# Patient Record
Sex: FEMALE | Race: BLACK OR AFRICAN AMERICAN | Employment: FULL TIME | URBAN - METROPOLITAN AREA
[De-identification: names, ages, dates, MRNs, and addresses within clinical notes are randomized per-mention and may not be internally consistent; named-entity substitution may affect disease eponyms.]

---

## 2017-04-01 ENCOUNTER — HOSPITAL ENCOUNTER (EMERGENCY)
Age: 37
Discharge: HOME | End: 2017-04-01
Attending: EMERGENCY MEDICINE
Payer: SELF-PAY

## 2017-04-01 VITALS
BODY MASS INDEX: 28.12 KG/M2 | SYSTOLIC BLOOD PRESSURE: 131 MMHG | TEMPERATURE: 98.1 F | RESPIRATION RATE: 18 BRPM | WEIGHT: 175 LBS | HEART RATE: 81 BPM | OXYGEN SATURATION: 100 % | DIASTOLIC BLOOD PRESSURE: 91 MMHG | HEIGHT: 66 IN

## 2017-04-01 DIAGNOSIS — F43.10 PTSD (POST-TRAUMATIC STRESS DISORDER): ICD-10-CM

## 2017-04-01 DIAGNOSIS — G43.009 MIGRAINE WITHOUT AURA AND WITHOUT STATUS MIGRAINOSUS, NOT INTRACTABLE: ICD-10-CM

## 2017-04-01 DIAGNOSIS — F41.9 ANXIETY: Primary | ICD-10-CM

## 2017-04-01 DIAGNOSIS — E87.6 HYPOKALEMIA: ICD-10-CM

## 2017-04-01 LAB
ANION GAP BLD CALC-SCNC: 10 MMOL/L (ref 5–15)
APPEARANCE UR: CLEAR
BACTERIA URNS QL MICRO: NEGATIVE /HPF
BASOPHILS # BLD AUTO: 0 K/UL (ref 0–0.1)
BASOPHILS # BLD: 0 % (ref 0–1)
BILIRUB UR QL: NEGATIVE
BUN SERPL-MCNC: 8 MG/DL (ref 6–20)
BUN/CREAT SERPL: 11 (ref 12–20)
CALCIUM SERPL-MCNC: 8.2 MG/DL (ref 8.5–10.1)
CHLORIDE SERPL-SCNC: 103 MMOL/L (ref 97–108)
CO2 SERPL-SCNC: 24 MMOL/L (ref 21–32)
COLOR UR: NORMAL
CREAT SERPL-MCNC: 0.71 MG/DL (ref 0.55–1.02)
EOSINOPHIL # BLD: 0.1 K/UL (ref 0–0.4)
EOSINOPHIL NFR BLD: 1 % (ref 0–7)
EPITH CASTS URNS QL MICRO: NORMAL /LPF
ERYTHROCYTE [DISTWIDTH] IN BLOOD BY AUTOMATED COUNT: 17.6 % (ref 11.5–14.5)
GLUCOSE SERPL-MCNC: 81 MG/DL (ref 65–100)
GLUCOSE UR STRIP.AUTO-MCNC: NEGATIVE MG/DL
HCG UR QL: NEGATIVE
HCT VFR BLD AUTO: 30.3 % (ref 35–47)
HGB BLD-MCNC: 9.6 G/DL (ref 11.5–16)
HGB UR QL STRIP: NEGATIVE
KETONES UR QL STRIP.AUTO: NEGATIVE MG/DL
LEUKOCYTE ESTERASE UR QL STRIP.AUTO: NEGATIVE
LYMPHOCYTES # BLD AUTO: 34 % (ref 12–49)
LYMPHOCYTES # BLD: 2.4 K/UL (ref 0.8–3.5)
MCH RBC QN AUTO: 23.6 PG (ref 26–34)
MCHC RBC AUTO-ENTMCNC: 31.7 G/DL (ref 30–36.5)
MCV RBC AUTO: 74.6 FL (ref 80–99)
MONOCYTES # BLD: 0.7 K/UL (ref 0–1)
MONOCYTES NFR BLD AUTO: 11 % (ref 5–13)
NEUTS SEG # BLD: 3.6 K/UL (ref 1.8–8)
NEUTS SEG NFR BLD AUTO: 54 % (ref 32–75)
NITRITE UR QL STRIP.AUTO: NEGATIVE
PH UR STRIP: 6.5 [PH] (ref 5–8)
PLATELET # BLD AUTO: 292 K/UL (ref 150–400)
POTASSIUM SERPL-SCNC: 3.2 MMOL/L (ref 3.5–5.1)
PROT UR STRIP-MCNC: NEGATIVE MG/DL
RBC # BLD AUTO: 4.06 M/UL (ref 3.8–5.2)
RBC #/AREA URNS HPF: NORMAL /HPF (ref 0–5)
SODIUM SERPL-SCNC: 137 MMOL/L (ref 136–145)
SP GR UR REFRACTOMETRY: <1.005 (ref 1–1.03)
UA: UC IF INDICATED,UAUC: NORMAL
UROBILINOGEN UR QL STRIP.AUTO: 0.2 EU/DL (ref 0.2–1)
WBC # BLD AUTO: 6.8 K/UL (ref 3.6–11)
WBC URNS QL MICRO: NORMAL /HPF (ref 0–4)

## 2017-04-01 PROCEDURE — 99285 EMERGENCY DEPT VISIT HI MDM: CPT

## 2017-04-01 PROCEDURE — 81001 URINALYSIS AUTO W/SCOPE: CPT | Performed by: EMERGENCY MEDICINE

## 2017-04-01 PROCEDURE — 36415 COLL VENOUS BLD VENIPUNCTURE: CPT | Performed by: EMERGENCY MEDICINE

## 2017-04-01 PROCEDURE — 90791 PSYCH DIAGNOSTIC EVALUATION: CPT

## 2017-04-01 PROCEDURE — 80048 BASIC METABOLIC PNL TOTAL CA: CPT | Performed by: EMERGENCY MEDICINE

## 2017-04-01 PROCEDURE — 74011250637 HC RX REV CODE- 250/637: Performed by: EMERGENCY MEDICINE

## 2017-04-01 PROCEDURE — 85025 COMPLETE CBC W/AUTO DIFF WBC: CPT | Performed by: EMERGENCY MEDICINE

## 2017-04-01 PROCEDURE — 81025 URINE PREGNANCY TEST: CPT

## 2017-04-01 PROCEDURE — 93005 ELECTROCARDIOGRAM TRACING: CPT

## 2017-04-01 RX ORDER — LORAZEPAM 1 MG/1
1 TABLET ORAL
Status: COMPLETED | OUTPATIENT
Start: 2017-04-01 | End: 2017-04-01

## 2017-04-01 RX ORDER — LORAZEPAM 0.5 MG/1
0.5 TABLET ORAL
Qty: 10 TAB | Refills: 0 | Status: SHIPPED | OUTPATIENT
Start: 2017-04-01

## 2017-04-01 RX ORDER — POTASSIUM CHLORIDE 750 MG/1
40 TABLET, FILM COATED, EXTENDED RELEASE ORAL
Status: COMPLETED | OUTPATIENT
Start: 2017-04-01 | End: 2017-04-01

## 2017-04-01 RX ADMIN — LORAZEPAM 1 MG: 1 TABLET ORAL at 08:41

## 2017-04-01 RX ADMIN — POTASSIUM CHLORIDE 40 MEQ: 750 TABLET, FILM COATED, EXTENDED RELEASE ORAL at 09:14

## 2017-04-01 NOTE — ED PROVIDER NOTES
HPI Comments: Beverley Kaur is a 39 y.o. female with PMHx significant for HTN, anemia, migraines, anxiety and palpitations presenting ambulatory to [facility] ED with c/o chest tightness and numbness/tingling in her bilateral hands since yesterday. She notes additional sx of anxiety, nausea, dizziness, chills, diarrhea since yesterday, 1 episode of vomiting this morning and acute on chronic exacerbation of her frontal headaches. The pt states that she has a Hx of migraines and notes that they have been progressively worsening. She reports that the only medications she takes for her migraines are Imitrex and states that they do not always help her with her migraines. She notes that she has also been battling depression for a long period of time due to substantial losses of her loved ones. She states that she has been seen by a psychiatrist once before but notes that it was more than 20 years ago and denies having been seen by one since then. She reports having tried Zoloft and Paxil to treat her depression but states that she did not like the way that they made her feel. She specifically denies any fevers, cough, cold sx, shortness of breath, rash, diarrhea, sweating, weight loss, chance of pregnancy or suicidal ideations. PCP: Kierra Birch MD  PSHx: tubal ligation   Social History:  (-) Tobacco,   (+) EtOH,      (-) Drugs     There are no other complaints, changes, or physical findings at this time. The history is provided by the patient. Past Medical History:   Diagnosis Date    Anemia     Hypertension     Neurological disorder     Migraines    Other ill-defined conditions     Palpitations    Psychiatric disorder     Anxiety       Past Surgical History:   Procedure Laterality Date    HX GYN      tubal ligation         History reviewed. No pertinent family history.     Social History     Social History    Marital status: SINGLE     Spouse name: N/A    Number of children: N/A    Years of education: N/A     Occupational History    Not on file. Social History Main Topics    Smoking status: Former Smoker    Smokeless tobacco: Not on file    Alcohol use Yes      Comment: Occassionally    Drug use: No    Sexual activity: Yes     Partners: Male     Other Topics Concern    Not on file     Social History Narrative         ALLERGIES: Review of patient's allergies indicates no known allergies. Review of Systems   Constitutional: Negative. Negative for activity change, appetite change, chills, fatigue, fever and unexpected weight change. HENT: Negative for congestion, hearing loss, rhinorrhea, sneezing and voice change. Eyes: Negative. Negative for pain and visual disturbance. Respiratory: Positive for chest tightness. Negative for apnea, cough, choking and shortness of breath. Cardiovascular: Negative. Negative for chest pain and palpitations. Gastrointestinal: Positive for nausea and vomiting. Negative for abdominal distention, abdominal pain, blood in stool and diarrhea. Genitourinary: Negative. Negative for difficulty urinating, flank pain, frequency and urgency. No discharge   Musculoskeletal: Negative. Negative for arthralgias, back pain, myalgias and neck stiffness. Skin: Negative. Negative for color change and rash. Neurological: Positive for dizziness, numbness and headaches. Negative for seizures, syncope, speech difficulty and weakness. Hematological: Negative for adenopathy. Psychiatric/Behavioral: Positive for dysphoric mood. Negative for agitation, behavioral problems and suicidal ideas. The patient is nervous/anxious. All other systems reviewed and are negative.       Vitals:    04/01/17 0758 04/01/17 0830 04/01/17 0927   BP: (!) 143/92 (!) 137/96 (!) 131/91   Pulse: 100 82 81   Resp: 16 18 18   Temp: 98.1 °F (36.7 °C)     SpO2: 100%  100%   Weight: 79.4 kg (175 lb)     Height: 5' 6\" (1.676 m)              Physical Exam   Nursing note and vitals reviewed. Physical Examination: General appearance - WDWN, tearful  Head - NC/AT  Eyes - pupils equal, round  and reactive, extraocular eye movements intact, conj/sclera clear, anicteric  Mouth - mucous membranes moist, pharynx normal without lesions  Nose/Ears - nares clear, Tms & canals clear  Neck - supple, no significant adenopathy, trachea midline, no crepitus, c spine diffusely non-tender, no step offs  Chest - Normal respiratory effort, clear to auscultation bilaterally, no wheezes/rales/rhonchi  Heart - normal rate and regular rhythm, S1 and S2 normal, no murmurs, gallops, or rubs  Abdomen - soft, nontender, nondistended, nabs, no masses, guarding, rebound or rigidity  Neurological - alert, oriented, normal speech, cranial nerves intact, no focal motor findings, motor & sensory diffusely intact, normal gait  Extremities/MS - peripheral pulses normal, no pedal edema, all joints atraumatic, FROM, non-tender, no gross deformities, spine diffusely non-tender  Skin - normal coloration and turgor, no rashes, no lesions or lacerations      MDM  Number of Diagnoses or Management Options  Anxiety:   Hypokalemia:   Migraine without aura and without status migrainosus, not intractable:   PTSD (post-traumatic stress disorder):   Diagnosis management comments:   DDx: anxiety arrhythmia, PTSD, depression, anemia, UTI       Amount and/or Complexity of Data Reviewed  Clinical lab tests: ordered and reviewed  Tests in the medicine section of CPT®: ordered and reviewed  Review and summarize past medical records: yes  Independent visualization of images, tracings, or specimens: yes    Patient Progress  Patient progress: stable    ED Course       Procedures    EKG interpretation: (Preliminary)  07:57  Rhythm: normal sinus rhythm; and regular .  Rate (approx.): 92; Axis: normal; NM interval: normal; QRS interval: normal ; ST/T wave: normal;  Other findings: normal.    9:29 AM  [facility] final results have been reviewed with her. She has been counseled regarding her diagnosis. She verbally conveys understanding and agreement of the signs, symptoms, diagnosis, treatment and prognosis . LABORATORY TESTS:  Recent Results (from the past 12 hour(s))   EKG, 12 LEAD, INITIAL    Collection Time: 04/01/17  7:57 AM   Result Value Ref Range    Ventricular Rate 92 BPM    Atrial Rate 92 BPM    P-R Interval 120 ms    QRS Duration 88 ms    Q-T Interval 356 ms    QTC Calculation (Bezet) 440 ms    Calculated P Axis 55 degrees    Calculated R Axis 56 degrees    Calculated T Axis 54 degrees    Diagnosis       Normal sinus rhythm  Normal ECG  When compared with ECG of 02-JAN-2012 11:37,  No significant change was found     HCG URINE, QL. - POC    Collection Time: 04/01/17  8:21 AM   Result Value Ref Range    Pregnancy test,urine (POC) NEGATIVE  NEG     CBC WITH AUTOMATED DIFF    Collection Time: 04/01/17  8:22 AM   Result Value Ref Range    WBC 6.8 3.6 - 11.0 K/uL    RBC 4.06 3.80 - 5.20 M/uL    HGB 9.6 (L) 11.5 - 16.0 g/dL    HCT 30.3 (L) 35.0 - 47.0 %    MCV 74.6 (L) 80.0 - 99.0 FL    MCH 23.6 (L) 26.0 - 34.0 PG    MCHC 31.7 30.0 - 36.5 g/dL    RDW 17.6 (H) 11.5 - 14.5 %    PLATELET 223 442 - 237 K/uL    NEUTROPHILS 54 32 - 75 %    LYMPHOCYTES 34 12 - 49 %    MONOCYTES 11 5 - 13 %    EOSINOPHILS 1 0 - 7 %    BASOPHILS 0 0 - 1 %    ABS. NEUTROPHILS 3.6 1.8 - 8.0 K/UL    ABS. LYMPHOCYTES 2.4 0.8 - 3.5 K/UL    ABS. MONOCYTES 0.7 0.0 - 1.0 K/UL    ABS. EOSINOPHILS 0.1 0.0 - 0.4 K/UL    ABS.  BASOPHILS 0.0 0.0 - 0.1 K/UL   METABOLIC PANEL, BASIC    Collection Time: 04/01/17  8:22 AM   Result Value Ref Range    Sodium 137 136 - 145 mmol/L    Potassium 3.2 (L) 3.5 - 5.1 mmol/L    Chloride 103 97 - 108 mmol/L    CO2 24 21 - 32 mmol/L    Anion gap 10 5 - 15 mmol/L    Glucose 81 65 - 100 mg/dL    BUN 8 6 - 20 MG/DL    Creatinine 0.71 0.55 - 1.02 MG/DL    BUN/Creatinine ratio 11 (L) 12 - 20      GFR est AA >60 >60 ml/min/1.73m2    GFR est non-AA >60 >60 ml/min/1.73m2    Calcium 8.2 (L) 8.5 - 10.1 MG/DL   URINALYSIS W/ REFLEX CULTURE    Collection Time: 04/01/17  8:22 AM   Result Value Ref Range    Color YELLOW/STRAW      Appearance CLEAR CLEAR      Specific gravity <1.005 1.003 - 1.030    pH (UA) 6.5 5.0 - 8.0      Protein NEGATIVE  NEG mg/dL    Glucose NEGATIVE  NEG mg/dL    Ketone NEGATIVE  NEG mg/dL    Bilirubin NEGATIVE  NEG      Blood NEGATIVE  NEG      Urobilinogen 0.2 0.2 - 1.0 EU/dL    Nitrites NEGATIVE  NEG      Leukocyte Esterase NEGATIVE  NEG      WBC 0-4 0 - 4 /hpf    RBC 0-5 0 - 5 /hpf    Epithelial cells FEW FEW /lpf    Bacteria NEGATIVE  NEG /hpf    UA:UC IF INDICATED CULTURE NOT INDICATED BY UA RESULT CNI       MEDICATIONS GIVEN:  Medications   LORazepam (ATIVAN) tablet 1 mg (1 mg Oral Given 4/1/17 0822)   potassium chloride SR (KLOR-CON 10) tablet 40 mEq (40 mEq Oral Given 4/1/17 9639)       IMPRESSION:  1. Anxiety    2. PTSD (post-traumatic stress disorder)    3. Migraine without aura and without status migrainosus, not intractable    4. Hypokalemia        PLAN:  1. Discharge Medication List as of 4/1/2017  9:08 AM        2. Follow-up Information     Follow up With Details Comments [address] [address] [phone]    [facility] [address]    Daryle Samuel, MD Schedule an appointment as soon as possible for a visit in 2 days  [address] [facility] [address] [place] [address]  [phone]          Return to ED if worse     DISCHARGE NOTE  9:19 AM  The patient has been re-evaluated and is ready for discharge. Reviewed available results with patient. Counseled pt on diagnosis and care plan. Pt has expressed understanding, and all questions have been answered. Pt agrees with plan and agrees to F/U as recommended, or return to the ED if their sxs worsen.  Discharge instructions have been provided and explained to the pt, along with reasons to return to the ED. This note is prepared by Jeana Mcmahan, acting as Scribe for [employer]. Jerome Li, [address] [place] [address]. Jerome Li MD: The scribe's documentation has been prepared under my direction and personally reviewed by me in its entirety. I confirm that the note above accurately reflects all work, treatment, procedures, and medical decision making performed by me.

## 2017-04-01 NOTE — LETTER
[facility] EMERGENCY DEPT 
[address] [facility] [address] [id number] [phone] Work/School Note Date: 4/1/2017 To Whom It May concern: Gabi Portillo was seen and treated today in the emergency room by the following provider(s): 
Attending Provider: Jasmine Aase, MD.   
 
Iam Kiser may return to work on 4/3/17. Sincerely, Miguel Angel Luna RN

## 2017-04-01 NOTE — DISCHARGE INSTRUCTIONS
Hypokalemia: Care Instructions  Your Care Instructions  Hypokalemia (say \"[id number]-[id number]-ANGELA-ingrid-uh\") is a low level of potassium. The heart, muscles, kidneys, and nervous system all need potassium to work well. This problem has many different causes. Kidney problems, diet, and medicines like diuretics and laxatives can cause it. So can vomiting or diarrhea. In some cases, cancer is the cause. Your doctor may do tests to find the cause of your low potassium levels. You may need medicines to bring your potassium levels back to normal. You may also need regular blood tests to check your potassium. If you have very low potassium, you may need intravenous (IV) medicines. You also may need tests to check the electrical activity of your heart. Heart problems caused by low potassium levels can be very serious. Follow-up care is a key part of your treatment and safety. Be sure to make and go to all appointments, and call your doctor if you are having problems. It's also a good idea to know your test results and keep a list of the medicines you take. How can you care for yourself at home? · If your doctor recommends it, eat foods that have a lot of potassium. These include fresh fruits, juices, and vegetables. They also include nuts, beans, and milk. · Be safe with medicines. If your doctor prescribes medicines or potassium supplements, take them exactly as directed. Call your doctor if you have any problems with your medicines. · Get your potassium levels tested as often as your doctor tells you. When should you call for help? Call 911 anytime you think you may need emergency care. For example, call if:  · You feel like your heart is missing beats. Heart problems caused by low potassium can cause death. · You passed out (lost consciousness). · You have a seizure. Call your doctor now or seek immediate medical care if:  · You feel weak or unusually tired. · You have severe arm or leg cramps.   · You have tingling or numbness. · You feel sick to your stomach, or you vomit. · You have belly cramps. · You feel bloated or constipated. · You have to urinate a lot. · You feel very thirsty most of the time. · You are dizzy or lightheaded, or you feel like you may faint. · You feel depressed, or you lose touch with reality. Watch closely for changes in your health, and be sure to contact your doctor if:  · You do not get better as expected. Where can you learn more? Go to http://more-jackeline.info/. Enter G358 in the search box to learn more about \"Hypokalemia: Care Instructions. \"  Current as of: July 28, 2016  Content Version: 11.2  © 0472-5242 [employer]. Care instructions adapted under license by [employer] (which disclaims liability or warranty for this information). If you have questions about a medical condition or this instruction, always ask your healthcare professional. [place] [address] any warranty or liability for your use of this information. Post-Traumatic Stress Disorder (PTSD): Care Instructions  Your Care Instructions  Post-traumatic stress disorder (PTSD) is a mental condition that can result from being in or seeing a traumatic or terrifying event. These events can include combat, a terrorist attack, a natural disaster, a serious accident, an assault, or a rape. If you have PTSD, you may often relive the experience in nightmares or flashbacks. These are clear and frightening memories of the event. You may also have trouble sleeping. PTSD affects people in very different ways. It can interfere with daily activities such as work or school, and it can make you withdraw from friends or loved ones. Follow-up care is a key part of your treatment and safety. Be sure to make and go to all appointments, and call your doctor if you are having problems.  It's also a good idea to know your test results and keep a list of the medicines you take.  How can you care for yourself at home? · Take medicines exactly as directed. Call your doctor if you think you are having a problem with your medicine. · Go to your counseling sessions and follow-up appointments. · Recognize and accept your anxiety. Then, when you are in a situation that makes you anxious, say to yourself, \"This is not an emergency. I feel uncomfortable, but I am not in danger. I can keep going even if I feel anxious. \"  · Be kind to your body:  ¨ Relieve tension with exercise or a massage. ¨ Get enough rest.  ¨ Avoid alcohol, caffeine, nicotine, and illegal drugs. They can increase your anxiety level and cause sleep problems. ¨ Learn and do relaxation techniques. See below for more about these techniques. · Engage your mind. Get out and do something you enjoy. Go to a [employer] movie, or take a walk or hike. Plan your day. Having too much or too little to do can make you anxious. · Keep a record of your symptoms. Discuss your fears with a good friend or family member, or join a support group for people with similar problems. Talking to others sometimes relieves stress. · Get involved in social groups, or volunteer to help others. Being alone sometimes makes things seem worse than they are. · Get at least 30 minutes of exercise on most days of the week. Walking is a good choice. You also may want to do other activities, such as running, swimming, cycling, or playing tennis or team sports. · Keep the numbers for these national suicide hotlines: 7-741-001-TALK (8-762.962.1556) and 3-577-UIZUXHX (8-639.905.2429). If you or someone you know talks about suicide or feeling hopeless, get help right away. Relaxation techniques  Do relaxation exercises 10 to 20 minutes a day. You can play soothing, relaxing music while you do them, if you wish. · Tell others in your house that you are going to do your relaxation exercises. Ask them not to disturb you.   · Find a comfortable place, away from all distractions and noise. · Lie down on your back, or sit with your back straight. · Focus on your breathing. Make it slow and steady. · Breathe in through your nose. Breathe out through either your nose or mouth. · Breathe deeply, filling up the area between your navel and your rib cage. Breathe so that your belly goes up and down. · Do not hold your breath. · Breathe like this for 5 to 10 minutes. Notice the feeling of calmness throughout your whole body. As you continue to breathe slowly and deeply, relax by doing the following for another 5 to 10 minutes:  · Tighten and relax each muscle group in your body. You can begin at your toes and work your way up to your head. · Imagine your muscle groups relaxing and becoming heavy. · Empty your mind of all thoughts. · Let yourself relax more and more deeply. · Become aware of the state of calmness that surrounds you. · When your relaxation time is over, you can bring yourself back to alertness by moving your fingers and toes and then your hands and feet and then stretching and moving your entire body. Sometimes people fall asleep during relaxation, but they usually wake up shortly afterward. · Always give yourself time to return to full alertness before you drive a car or do anything that might cause an accident if you are not fully alert. Never play a relaxation tape while you drive a car. When should you call for help? Call 911 anytime you think you may need emergency care. For example, call if:  · You feel you cannot stop from hurting yourself or someone else. Watch closely for changes in your health, and be sure to contact your doctor if:  · Your PTSD symptoms are getting worse. · You have new or worsening symptoms of anxiety. · You are not getting better as expected. Where can you learn more? Go to http://more-jackeline.info/.   [place] in the search box to learn more about \"Post-Traumatic Stress Disorder (PTSD): Care Instructions. \"  Current as of: July 26, 2016  Content Version: 11.2  © 1094-9186 [employer]. Care instructions adapted under license by [employer] (which disclaims liability or warranty for this information). If you have questions about a medical condition or this instruction, always ask your healthcare professional. [address] any warranty or liability for your use of this information. Migraine Headache: Care Instructions  Your Care Instructions  Migraines are painful, throbbing headaches that often start on one side of the head. They may cause nausea and vomiting and make you sensitive to light, sound, or smell. Without treatment, migraines can last from 4 hours to a few days. Medicines can help prevent migraines or stop them after they have started. Your doctor can help you find which ones work best for you. Follow-up care is a key part of your treatment and safety. Be sure to make and go to all appointments, and call your doctor if you are having problems. It's also a good idea to know your test results and keep a list of the medicines you take. How can you care for yourself at home? · Do not drive if you have taken a prescription pain medicine. · Rest in a quiet, dark room until your headache is gone. Close your eyes, and try to relax or go to sleep. Don't watch TV or read. · Put a cold, moist cloth or cold pack on the painful area for 10 to 20 minutes at a time. Put a thin cloth between the cold pack and your skin. · Use a warm, moist towel or a heating pad set on low to relax tight shoulder and neck muscles. · Have someone gently massage your neck and shoulders. · Take your medicines exactly as prescribed. Call your doctor if you think you are having a problem with your medicine. You will get more details on the specific medicines your doctor prescribes. · Be careful not to take pain medicine more often than the instructions allow.  You could get worse or more frequent headaches when the medicine wears off. To prevent migraines  · Keep a headache diary so you can figure out what triggers your headaches. Avoiding triggers may help you prevent headaches. Record when each headache began, how long it lasted, and what the pain was like. (Was it throbbing, aching, stabbing, or dull?) Write down any other symptoms you had with the headache, such as nausea, flashing lights or dark spots, or sensitivity to bright light or loud noise. Note if the headache occurred near your period. List anything that might have triggered the headache. Triggers may include certain foods (chocolate, cheese, wine) or odors, smoke, bright light, stress, or lack of sleep. · If your doctor has prescribed medicine for your migraines, take it as directed. You may have medicine that you take only when you get a migraine and medicine that you take all the time to help prevent migraines. ¨ If your doctor has prescribed medicine for when you get a headache, take it at the first sign of a migraine, unless your doctor has given you other instructions. ¨ If your doctor has prescribed medicine to prevent migraines, take it exactly as prescribed. Call your doctor if you think you are having a problem with your medicine. · Find healthy ways to deal with stress. Migraines are most common during or right after stressful times. Take time to relax before and after you do something that has caused a migraine in the past.  · Try to keep your muscles relaxed by keeping good posture. Check your jaw, face, neck, and shoulder muscles for tension. Try to relax them. When you sit at a desk, change positions often. And make sure to stretch for 30 seconds each hour. · Get plenty of sleep and exercise. · Eat meals on a regular schedule. Avoid foods and drinks that often trigger migraines.  These include chocolate, alcohol (especially red wine and port), aspartame, monosodium glutamate (MSG), and some additives found in foods (such as hot dogs, patterson, cold cuts, aged cheeses, and pickled foods). · Limit caffeine. Don't drink too much coffee, tea, or soda. But don't quit caffeine suddenly. That can also give you migraines. · Do not smoke or allow others to smoke around you. If you need help quitting, talk to your doctor about stop-smoking programs and medicines. These can increase your chances of quitting for good. · If you are taking birth control pills or hormone therapy, talk to your doctor about whether they are triggering your migraines. When should you call for help? Call 911 anytime you think you may need emergency care. For example, call if:  · You have signs of a stroke. These may include:  ¨ Sudden numbness, paralysis, or weakness in your face, arm, or leg, especially on only one side of your body. ¨ Sudden vision changes. ¨ Sudden trouble speaking. ¨ Sudden confusion or trouble understanding simple statements. ¨ Sudden problems with walking or balance. ¨ A sudden, severe headache that is different from past headaches. Call your doctor now or seek immediate medical care if:  · You have new or worse nausea and vomiting. · You have a new or higher fever. · Your headache gets much worse. Watch closely for changes in your health, and be sure to contact your doctor if:  · You are not getting better after 2 days (48 hours). Where can you learn more? Go to http://more-jackeline.info/. Enter G197 in the search box to learn more about \"Migraine Headache: Care Instructions. \"  Current as of: October 14, 2016  Content Version: 11.2  © 8477-0882 [employer]. Care instructions adapted under license by [employer] (which disclaims liability or warranty for this information).  If you have questions about a medical condition or this instruction, always ask your healthcare professional. [place] [address] any warranty or liability for your use of this information.

## 2017-04-01 NOTE — BSMART NOTE
Patient is a 44yo female who arrives to ED with mother due to chest pain. Patient reports that she has had multiple losses with the first being her father when she was a child who  in front of her at their home. She then lost her high school boyfriend to a car accident, her eldest child at 10yo was killed in an accident that they were involved in, her brother  and her eldest brother committed suicide, and she has had multiple other family members and friends die from medical issues within the last 10 years. Patient reports being in treatment when a child for the grief and \"emotuional disturbances\" that she displayed after the loss of her father and has attempted to seek services but has been unsuccessful in the last few months. Patient reports that she has no insurance but that her PCP is currently prescribing Ativan 1mg TID for anxiety which is helpful but does not help the depression. Patient denies suicidal and homicidal ideation. Patient does not wish to be admitted but would like resources for a psychiatrist and counselor. Patient given a list of resources for psychiatrists that accept self pay patients, sliding scale, or payment plans as well as a list of trauma therapists and grief support groups in the area. Patient reports attending grief support at her [employer] and her family is supportive and helps her cope. Patient reports biggest protective factor is her 16yo twins and family support, especially from mother.     Axis I: PTSD   Generalized Anxiety Disorder   Major Depressive Disorder  Axis II: Deferred  Axis III:   Past Medical History:   Diagnosis Date    Anemia     Hypertension     Neurological disorder     Migraines    Other ill-defined conditions     Palpitations    Psychiatric disorder     Anxiety   Axis IV: lack of structure, employment issues, grief/loss, access to mental healthcare  Axis V: Jose Guadalupe Tate 89, MA [facility] Resident in Counseling

## 2017-04-04 LAB
ATRIAL RATE: 92 BPM
CALCULATED P AXIS, ECG09: 55 DEGREES
CALCULATED R AXIS, ECG10: 56 DEGREES
CALCULATED T AXIS, ECG11: 54 DEGREES
DIAGNOSIS, 93000: NORMAL
P-R INTERVAL, ECG05: 120 MS
Q-T INTERVAL, ECG07: 356 MS
QRS DURATION, ECG06: 88 MS
QTC CALCULATION (BEZET), ECG08: 440 MS
VENTRICULAR RATE, ECG03: 92 BPM

## 2017-09-26 ENCOUNTER — OFFICE VISIT (OUTPATIENT)
Dept: NEUROLOGY | Age: 37
End: 2017-09-26

## 2017-09-26 VITALS
SYSTOLIC BLOOD PRESSURE: 128 MMHG | DIASTOLIC BLOOD PRESSURE: 70 MMHG | HEIGHT: 66 IN | HEART RATE: 80 BPM | WEIGHT: 180 LBS | BODY MASS INDEX: 28.93 KG/M2 | OXYGEN SATURATION: 98 %

## 2017-09-26 DIAGNOSIS — G43.909 MIGRAINE WITHOUT STATUS MIGRAINOSUS, NOT INTRACTABLE, UNSPECIFIED MIGRAINE TYPE: Primary | ICD-10-CM

## 2017-09-26 DIAGNOSIS — R51.9 HEADACHE DISORDER: ICD-10-CM

## 2017-09-26 DIAGNOSIS — F32.A DEPRESSION, UNSPECIFIED DEPRESSION TYPE: ICD-10-CM

## 2017-09-26 PROBLEM — G43.009 MIGRAINE WITHOUT AURA AND WITHOUT STATUS MIGRAINOSUS, NOT INTRACTABLE: Status: ACTIVE | Noted: 2017-09-26

## 2017-09-26 RX ORDER — AMITRIPTYLINE HYDROCHLORIDE 10 MG/1
TABLET, FILM COATED ORAL
Qty: 90 TAB | Refills: 5 | Status: SHIPPED | OUTPATIENT
Start: 2017-09-26

## 2017-09-26 RX ORDER — BUTALBITAL, ACETAMINOPHEN AND CAFFEINE 50; 325; 40 MG/1; MG/1; MG/1
1 TABLET ORAL
Qty: 40 TAB | Refills: 5 | Status: SHIPPED | OUTPATIENT
Start: 2017-09-26

## 2017-09-26 RX ORDER — VENLAFAXINE 37.5 MG/1
TABLET ORAL
Qty: 60 TAB | Refills: 5 | Status: SHIPPED | OUTPATIENT
Start: 2017-09-26

## 2017-09-26 NOTE — MR AVS SNAPSHOT
Visit Information Date & Time Provider Department Dept. Phone Encounter #  
 9/26/2017  9:00 AM Taj Mcgowan MD [facility] [phone] [id number] Your Appointments 1/30/2018 11:40 AM  
Follow Up with Taj Mcgowan MD  
[facility] [address]) Appt Note: follow up migraines $ 0 CP jll 9/26/17  
 [facility], 
[address] [place] [address], [address] [phone]  
[facility], [address] [place] [address], [address] [phone] Upcoming Health Maintenance Date Due DTaP/Tdap/Td series (1 - Tdap) 9/6/2001 PAP AKA CERVICAL CYTOLOGY 9/6/2001 INFLUENZA AGE 9 TO ADULT 8/1/2017 Allergies as of 9/26/2017  Review Complete On: 9/26/2017 By: Lazara Ardon LPN No Known Allergies Current Immunizations  Never Reviewed No immunizations on file. Not reviewed this visit You Were Diagnosed With   
  
 Codes Comments Headache disorder    -  Primary ICD-10-CM: R46 ICD-9-CM: 257. 0 Vitals BP Pulse Height(growth percentile) Weight(growth percentile) SpO2 BMI  
 128/70 80 5' 6\" (1.676 m) 180 lb (81.6 kg) 98% 29.05 kg/m2 OB Status Smoking Status Having regular periods Former Smoker Vitals History BMI and BSA Data Body Mass Index Body Surface Area 29.05 kg/m 2 1.95 m 2 Preferred Pharmacy Pharmacy Name Phone [facility] [address] [facility] [address] [facility] [phone] Your Updated Medication List  
  
   
This list is accurate as of: 9/26/17  9:46 AM.  Always use your most recent med list.  
  
  
  
  
 amitriptyline 10 mg tablet Commonly known as:  ELAVIL Take 1 tablet at night for a week then 2 tablets at night for a week then 3 tablets nightly  Indications: MIGRAINE PREVENTION  
  
 butalbital-acetaminophen-caffeine -40 mg per tablet Commonly known as:  Oracio Sharp Take 1 Tab by mouth every six (6) hours as needed for Pain. Max Daily Amount: 4 Tabs. This medication is only to be filled in one month intervals  Indications: This medication is only to be filled in one month intervals IMITREX PO Take  by mouth. LORazepam 0.5 mg tablet Commonly known as:  ATIVAN Take 1 Tab by mouth every eight (8) hours as needed for Anxiety. Max Daily Amount: 1.5 mg.  
  
 venlafaxine 37.5 mg tablet Commonly known as:  [facility] Take 1 tablet at night for 2 weeks then 1 tablet twice daily  Indications: GENERALIZED ANXIETY DISORDER, major depressive disorder Prescriptions Printed Refills  
 butalbital-acetaminophen-caffeine (FIORICET, ESGIC) -40 mg per tablet 5 Sig: Take 1 Tab by mouth every six (6) hours as needed for Pain. Max Daily Amount: 4 Tabs. This medication is only to be filled in one month intervals  Indications: This medication is only to be filled in one month intervals Class: Print Route: Oral  
  
Prescriptions Sent to Pharmacy Refills  
 venlafaxine (EFFEXOR) 37.5 mg tablet 5 Sig: Take 1 tablet at night for 2 weeks then 1 tablet twice daily  Indications: GENERALIZED ANXIETY DISORDER, major depressive disorder Class: Normal  
 [facility]acy: [facility] [id number] Ph #: [phone]  
 amitriptyline (ELAVIL) 10 mg tablet 5 Sig: Take 1 tablet at night for a week then 2 tablets at night for a week then 3 tablets nightly  Indications: MIGRAINE PREVENTION Class: Normal  
 [facility] [address] Ph #: [phone] To-Do List   
 09/29/2017 Imaging:  CT HEAD W WO CONT Introducing [facility]! [facility] introduces Speedment patient portal. Now you can access parts of your medical record, email your doctor's office, and request medication refills online. 1. In your internet browser, go to [email or web address] 2. Click on the First Time User? Click Here link in the Sign In box. You will see the New Member Sign Up page. 3. Enter your Aviate Access Code exactly as it appears below. You will not need to use this code after youve completed the sign-up process. If you do not sign up before the expiration date, you must request a new code. · Aviate Access Code: [email or web address] Expires: 12/25/2017  8:46 AM 
 
4. Enter the last four digits of your Social Security Number (xxxx) and Date of Birth (mm/dd/yyyy) as indicated and click Submit. You will be taken to the next sign-up page. 5. Create a [facility] [employer] ID. This will be your Aviate login ID and cannot be changed, so think of one that is secure and easy to remember. 6. Create a Aviate password. You can change your password at any time. 7. Enter your Password Reset Question and Answer. This can be used at a later time if you forget your password. 8. Enter your e-mail address. You will receive e-mail notification when new information is available in [address] 9. Click Sign Up. You can now view and download portions of your medical record. 10. Click the Download Summary menu link to download a portable copy of your medical information. If you have questions, please visit the Frequently Asked Questions section of the Aviate website. Remember, Aviate is NOT to be used for urgent needs. For medical emergencies, dial 911. Now available from your iPhone and Android! Please provide this summary of care documentation to your next provider. Your primary care clinician is listed as Severiano Limon. If you have any questions after today's visit, please call [phone].

## 2017-09-26 NOTE — PROGRESS NOTES
HISTORY OF PRESENT ILLNESS  Queen Blaine is a 40 y.o. female. HPI Comments: Queen Blaine is a 80-year-old woman who comes in today complaining of constant migraines for years. She believes she will have 4-6 migraines a month. They are bilateral when they occur are gradually worsening and are associated with photophobia and some nausea very little vomiting. She has been taking propranolol LA 60 mg as a preventive. She takes Imitrex 50 mg at the onset and will take Lorazepam 0.5 mg. This may or may not help the headache, it varies. She states she has been treated for anxiety for years. She is an . She has had a CT scan of her head done many years ago which he says was normal.    Migraine    This is a chronic problem. The problem has not changed since onset. The headache is aggravated by nothing. Review of Systems   Constitutional:        Review of systems is positive for anxiety, depression, fatigue, frequent headaches, leg swelling, memory loss, occasional nausea and vomiting, tinnitus, shortness of breath, visual disturbance with her headaches and weight gain. All other symptoms are reviewed and are negative     Current Outpatient Prescriptions on File Prior to Visit   Medication Sig Dispense Refill    SUMATRIPTAN SUCCINATE (IMITREX PO) Take  by mouth.  LORazepam (ATIVAN) 0.5 mg tablet Take 1 Tab by mouth every eight (8) hours as needed for Anxiety. Max Daily Amount: 1.5 mg. 10 Tab 0     No current facility-administered medications on file prior to visit. Past Medical History:   Diagnosis Date    Anemia     Headache     Hypertension     Neurological disorder     Migraines    Other ill-defined conditions     Palpitations    Psychiatric disorder     Anxiety     History reviewed. No pertinent family history.   Social History   Substance Use Topics    Smoking status: Former Smoker    Smokeless tobacco: Never Used    Alcohol use Yes      Comment: Occassionally     Patient Active Problem List   Diagnosis Code    Migraine without aura and without status migrainosus, not intractable G43.009       /70  Pulse 80  Ht 5' 6\" (1.676 m)  Wt 180 lb (81.6 kg)  SpO2 98%  BMI 29.05 kg/m2  Physical Exam   Constitutional: She is oriented to person, place, and time. She appears well-developed and well-nourished. No distress. HENT:   Head: Normocephalic and atraumatic. Mouth/Throat: Oropharynx is clear and moist. No oropharyngeal exudate. Eyes: Conjunctivae and EOM are normal. Pupils are equal, round, and reactive to light. No scleral icterus. Neck: Normal range of motion. Neck supple. No thyromegaly present. Cardiovascular: Normal rate, regular rhythm and normal heart sounds. Musculoskeletal: Normal range of motion. She exhibits no edema, tenderness or deformity. Lymphadenopathy:     She has no cervical adenopathy. Neurological: She is alert and oriented to person, place, and time. She has normal strength and normal reflexes. She displays no atrophy and no tremor. No cranial nerve deficit or sensory deficit. She exhibits normal muscle tone. She displays a negative Romberg sign. Coordination and gait normal. She displays no Babinski's sign on the right side. She displays no Babinski's sign on the left side. Speech language and mentation are normal  Visual fields full to confrontation, funduscopic exam reveals flat discs the retina and vasculature normal   Skin: Skin is warm and dry. No rash noted. She is not diaphoretic. No erythema. Psychiatric: Her behavior is normal. Judgment and thought content normal.   She is a bit guarded. Vitals reviewed. ASSESSMENT and PLAN  MIGRAINE  This patient's migraines are under poor control. I will keep her propranolol LA 60 mg a day going. She will continue to take her Imitrex 50 mg at the onset of a headache.   I have given her some Fioricet that she can take 1 or 2 when she takes the Imitrex, she is not to take this on a daily basis.  I am going to start her on Effexor 37.5 mg a day as I think she is moderately depressed, she is to increase the Effexor after 2 weeks to 37.5 mg twice daily. .  I did not explore today but I would not be surprised to find that she has PTSD as a  for her depression from traumatic early life experiences. I will leave that for another day. I will order a CT scan of her head with contrast as I doubt her insurance will approve an MRI scan at this point. That should be good enough to eliminate other concerns anyway. See her back in 4 months, I have asked her to call us if she has problems with the medication in the meantime. This note will not be viewable in [address]

## 2017-09-26 NOTE — LETTER
NOTIFICATION RETURN TO WORK / SCHOOL 
 
9/26/2017 9:49 AM 
 
Ms. Sneha Graf 
[address] [place] [address] To Whom It May Concern: Sneha Graf is currently under the care of the  [address] [place] [address]. She was seen today by Dr. Lashaun Raya She will return to work on 9/26/17 If there are questions or concerns please contact our office. Sincerely, Quan German MD

## 2017-09-26 NOTE — LETTER
9/26/2017 4:38 PM 
 
Patient:  Nash Seip YOB: 1980 Date of Visit: 9/26/2017 Dear Franco Francisco MD 
[address] [id number] VIA Facsimile: [phone] 
 : Thank you for referring Ms. Gabi Jimenez to me for evaluation/treatment. Below are the relevant portions of my assessment and plan of care. HISTORY OF PRESENT ILLNESS Nash Seip is a 40 y.o. female. HPI Comments: Nash Seip is a 63-year-old woman who comes in today complaining of constant migraines for years. She believes she will have 4-6 migraines a month. They are bilateral when they occur are gradually worsening and are associated with photophobia and some nausea very little vomiting. She has been taking propranolol LA 60 mg as a preventive. She takes Imitrex 50 mg at the onset and will take Lorazepam 0.5 mg. This may or may not help the headache, it varies. She states she has been treated for anxiety for years. She is an . She has had a CT scan of her head done many years ago which he says was normal. 
 
Migraine This is a chronic problem. The problem has not changed since onset. The headache is aggravated by nothing. Review of Systems Constitutional:  
     Review of systems is positive for anxiety, depression, fatigue, frequent headaches, leg swelling, memory loss, occasional nausea and vomiting, tinnitus, shortness of breath, visual disturbance with her headaches and weight gain. All other symptoms are reviewed and are negative Current Outpatient Prescriptions on File Prior to Visit Medication Sig Dispense Refill  SUMATRIPTAN SUCCINATE (IMITREX PO) Take  by mouth.  LORazepam (ATIVAN) 0.5 mg tablet Take 1 Tab by mouth every eight (8) hours as needed for Anxiety. Max Daily Amount: 1.5 mg. 10 Tab 0 No current facility-administered medications on file prior to visit. Past Medical History:  
Diagnosis Date  Anemia  Headache  Hypertension  Neurological disorder Migraines  Other ill-defined conditions Palpitations  Psychiatric disorder Anxiety History reviewed. No pertinent family history. Social History Substance Use Topics  Smoking status: Former Smoker  Smokeless tobacco: Never Used  Alcohol use Yes Comment: Occassionally Patient Active Problem List  
Diagnosis Code  Migraine without aura and without status migrainosus, not intractable G43.009 /70  Pulse 80  Ht 5' 6\" (1.676 m)  Wt 180 lb (81.6 kg)  SpO2 98%  BMI 29.05 kg/m2 Physical Exam  
Constitutional: She is oriented to person, place, and time. She appears well-developed and well-nourished. No distress. HENT:  
Head: Normocephalic and atraumatic. Mouth/Throat: Oropharynx is clear and moist. No oropharyngeal exudate. Eyes: Conjunctivae and EOM are normal. Pupils are equal, round, and reactive to light. No scleral icterus. Neck: Normal range of motion. Neck supple. No thyromegaly present. Cardiovascular: Normal rate, regular rhythm and normal heart sounds. Musculoskeletal: Normal range of motion. She exhibits no edema, tenderness or deformity. Lymphadenopathy:  
  She has no cervical adenopathy. Neurological: She is alert and oriented to person, place, and time. She has normal strength and normal reflexes. She displays no atrophy and no tremor. No cranial nerve deficit or sensory deficit. She exhibits normal muscle tone. She displays a negative Romberg sign. Coordination and gait normal. She displays no Babinski's sign on the right side. She displays no Babinski's sign on the left side. Speech language and mentation are normal 
Visual fields full to confrontation, funduscopic exam reveals flat discs the retina and vasculature normal  
Skin: Skin is warm and dry. No rash noted. She is not diaphoretic. No erythema. Psychiatric: Her behavior is normal. Judgment and thought content normal.  
She is a bit guarded. Vitals reviewed. ASSESSMENT and PLAN 
MIGRAINE This patient's migraines are under poor control. I will keep her propranolol LA 60 mg a day going. She will continue to take her Imitrex 50 mg at the onset of a headache. I have given her some Fioricet that she can take 1 or 2 when she takes the Imitrex, she is not to take this on a daily basis. I am going to start her on Effexor 37.5 mg a day as I think she is moderately depressed, she is to increase the Effexor after 2 weeks to 37.5 mg twice daily. .  I did not explore today but I would not be surprised to find that she has PTSD as a  for her depression from traumatic early life experiences. I will leave that for another day. I will order a CT scan of her head with contrast as I doubt her insurance will approve an MRI scan at this point. That should be good enough to eliminate other concerns anyway. See her back in 4 months, I have asked her to call us if she has problems with the medication in the meantime. This note will not be viewable in [address] If you have questions, please do not hesitate to call me. I look forward to following Ms. Lupillo Ramirez along with you. Sincerely, Shiloh Ann MD

## 2019-04-13 ENCOUNTER — HOSPITAL ENCOUNTER (EMERGENCY)
Age: 39
Discharge: HOME | End: 2019-04-13
Attending: EMERGENCY MEDICINE
Payer: COMMERCIAL

## 2019-04-13 ENCOUNTER — APPOINTMENT (OUTPATIENT)
Dept: GENERAL RADIOLOGY | Age: 39
End: 2019-04-13
Attending: PHYSICIAN ASSISTANT
Payer: COMMERCIAL

## 2019-04-13 VITALS
DIASTOLIC BLOOD PRESSURE: 101 MMHG | RESPIRATION RATE: 15 BRPM | HEIGHT: 67 IN | SYSTOLIC BLOOD PRESSURE: 155 MMHG | OXYGEN SATURATION: 100 % | BODY MASS INDEX: 31.86 KG/M2 | HEART RATE: 76 BPM | TEMPERATURE: 97.8 F | WEIGHT: 203 LBS

## 2019-04-13 DIAGNOSIS — M47.816 SPONDYLOSIS OF LUMBAR SPINE: ICD-10-CM

## 2019-04-13 DIAGNOSIS — S32.010A CLOSED COMPRESSION FRACTURE OF FIRST LUMBAR VERTEBRA, INITIAL ENCOUNTER: ICD-10-CM

## 2019-04-13 DIAGNOSIS — M50.30 DEGENERATIVE DISC DISEASE, CERVICAL: Primary | ICD-10-CM

## 2019-04-13 PROCEDURE — 72050 X-RAY EXAM NECK SPINE 4/5VWS: CPT

## 2019-04-13 PROCEDURE — 99282 EMERGENCY DEPT VISIT SF MDM: CPT

## 2019-04-13 PROCEDURE — 96372 THER/PROPH/DIAG INJ SC/IM: CPT

## 2019-04-13 PROCEDURE — 72100 X-RAY EXAM L-S SPINE 2/3 VWS: CPT

## 2019-04-13 PROCEDURE — 74011250636 HC RX REV CODE- 250/636: Performed by: PHYSICIAN ASSISTANT

## 2019-04-13 RX ORDER — ACETAMINOPHEN 500 MG
1000 TABLET ORAL
Qty: 20 TAB | Refills: 0 | Status: SHIPPED | OUTPATIENT
Start: 2019-04-13

## 2019-04-13 RX ORDER — METHOCARBAMOL 500 MG/1
500 TABLET, FILM COATED ORAL 4 TIMES DAILY
Qty: 15 TAB | Refills: 0 | Status: SHIPPED | OUTPATIENT
Start: 2019-04-13

## 2019-04-13 RX ORDER — NAPROXEN 500 MG/1
500 TABLET ORAL
Qty: 20 TAB | Refills: 0 | Status: SHIPPED | OUTPATIENT
Start: 2019-04-13

## 2019-04-13 RX ORDER — KETOROLAC TROMETHAMINE 30 MG/ML
30 INJECTION, SOLUTION INTRAMUSCULAR; INTRAVENOUS
Status: COMPLETED | OUTPATIENT
Start: 2019-04-13 | End: 2019-04-13

## 2019-04-13 RX ADMIN — KETOROLAC TROMETHAMINE 30 MG: 30 INJECTION INTRAMUSCULAR; INTRAVENOUS at 13:34

## 2019-04-13 NOTE — DISCHARGE INSTRUCTIONS
Patient Education        Cervical Disc Disease: Care Instructions  Your Care Instructions    Cervical disc disease results from damage, disease, or wear and tear to the discs between the bones (vertebra) in your neck. The discs act as shock absorbers for the spine and keep the spine flexible. When a disc is damaged, it can bulge out and press against the nerve roots or spinal cord. This is sometimes called a herniated or \"slipped disc. \" This pressure can cause pain and numbness or tingling in your arms and hands. It can also cause weakness in your legs. An accident can damage a disc and cause it to break open (rupture). Aging and hard physical work can also cause damage to cervical discs. The first treatments for cervical disc disease include physical therapy, special neck exercises, heat, and pain medicine. If these fail, your doctor may inject steroids and pain medicine into your neck. Surgery is usually done only if other treatments have not worked. Follow-up care is a key part of your treatment and safety. Be sure to make and go to all appointments, and call your doctor if you are having problems. It's also a good idea to know your test results and keep a list of the medicines you take. How can you care for yourself at home? · Take pain medicines exactly as directed. ? If the doctor gave you a prescription medicine for pain, take it as prescribed. ? If you are not taking a prescription pain medicine, ask your doctor if you can take an over-the-counter medicine. · Don't spend too long in one position. Take short breaks to move around and change positions. · Wear a seat belt and shoulder harness when you are in a car. · Sleep with a pillow under your head and neck that keeps your neck straight. · Follow your doctor's instructions for gentle neck-stretching exercises. · Do not smoke. Smoking can slow healing of your discs.  If you need help quitting, talk to your doctor about stop-smoking programs and medicines. These can increase your chances of quitting for good. · Avoid strenuous work or exercise until your doctor says it is okay. When should you call for help? Call 911 anytime you think you may need emergency care. For example, call if:    · You are unable to move an arm or a leg at all.   [facility] your doctor now or seek immediate medical care if:    · You have new or worse symptoms in your arms, legs, belly, or buttocks. Symptoms may include:  ? Numbness or tingling. ? Weakness. ? Pain.     · You lose bladder or bowel control.    Watch closely for changes in your health, and be sure to contact your doctor if:    · You do not get better as expected. Where can you learn more? Go to http://more-jackeline.info/. Enter N118 in the search box to learn more about \"Cervical Disc Disease: Care Instructions. \"  Current as of: September 20, 2018  Content Version: 11.9  © 6244-3059 [employer]. Care instructions adapted under license by [employer] (which disclaims liability or warranty for this information). If you have questions about a medical condition or this instruction, always ask your healthcare professional. [address] [address] any warranty or liability for your use of this information. Patient Education        Compression Fracture of the Spine: Care Instructions  Your Care Instructions    A compression fracture happens when the front part of a spinal bone breaks and collapses. A fall or other accident can cause it. A minor injury or moving the wrong way can cause a break if you have thin or brittle bones (osteoporosis). These types of breaks will heal in 8 to 10 weeks. You will need rest and pain medicines. Your doctor may recommend physical therapy. Some doctors recommend that certain people with compression fractures wear braces. Your doctor also may treat thin or brittle bones.  You may need surgery if you have lasting pain or if the bone presses on the spinal cord or nerves. You heal best when you take good care of yourself. Eat a variety of healthy foods, and don't smoke. Follow-up care is a key part of your treatment and safety. Be sure to make and go to all appointments, and call your doctor if you are having problems. It's also a good idea to know your test results and keep a list of the medicines you take. How can you care for yourself at home? · Be safe with medicines. Read and follow all instructions on the label. ? If the doctor gave you a prescription medicine for pain, take it as prescribed. ? If you are not taking a prescription pain medicine, ask your doctor if you can take an over-the-counter medicine. · Talk to your doctor about how to make your bones stronger. You may need medicines or a change in what you eat. · Be active only as directed by your doctor. When should you call for help? Call 911 anytime you think you may need emergency care. For example, call if:    · You are unable to move an arm or a leg at all.   [facility] your doctor now or seek immediate medical care if:    · You have new or worse symptoms in your arms, legs, belly, or buttocks. Symptoms may include:  ? Numbness or tingling. ? Weakness. ? Pain.     · You lose bladder or bowel control.     · You have belly pain, bloating, vomiting, or nausea.    Watch closely for changes in your health, and be sure to contact your doctor if:    · You do not get better as expected. Where can you learn more? Go to http://more-jackeline.info/. Enter P445 in the search box to learn more about \"Compression Fracture of the Spine: Care Instructions. \"  Current as of: September 20, 2018  Content Version: 11.9  © 0290-7887 [employer]. Care instructions adapted under license by [employer] (which disclaims liability or warranty for this information).  If you have questions about a medical condition or this instruction, always ask your healthcare professional. [address] any warranty or liability for your use of this information.

## 2019-04-13 NOTE — LETTER
[facility] EMERGENCY DEPT 
[address] [facility] [address] [phone] 
[phone] Work/School Note Date: 4/13/2019 To Whom It May concern: Lidia Lopez was seen and treated today in the emergency room by the following provider(s): 
Attending Provider: Gwendolyn Man MD 
Physician Assistant: Tay Crook PA-C. Lidia Lopez may return to work on 4/15/2019. Sincerely, Kai Thibodeaux PA-C

## 2019-04-13 NOTE — ED PROVIDER NOTES
EMERGENCY DEPARTMENT HISTORY AND PHYSICAL EXAM 
 
 
Date: 4/13/2019 Patient Name: ROSELIA KIM History of Presenting Illness Chief Complaint Patient presents with  Back Pain  Neck Pain History Provided By: Patient HPI: ROSELIA KIM, 45 y.o. female with PMHx significant for hypertension, anemia, migraine headaches, anxiety, presents ambulatory to the ED with cc of chronic aching intermittent left-sided neck, upper back, lobe pain X months. Patient endorses symptoms have worsened in the last couple of days. Heat with moderate relief but states it gets worse after laying down. Over-the-counter NSAIDs with mild relief. No medications today. Patient denies any injuries or falls. 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 Denies fever, chills, nausea, vomiting, headache, lightheadedness, dizziness, chest pain, shortness of breath, abdominal pain, incontinence, saddle paresthesias, numbness, tingling, limited range of motion, urinary symptoms, weakness. There are no other complaints, changes, or physical findings at this time. PCP: Rsoa Elena Calvert MD 
 
No current facility-administered medications on file prior to encounter. Current Outpatient Medications on File Prior to Encounter Medication Sig Dispense Refill  LORazepam (ATIVAN) 0.5 mg tablet Take 1 Tab by mouth every eight (8) hours as needed for Anxiety. Max Daily Amount: 1.5 mg. 10 Tab 0  
 venlafaxine (EFFEXOR) 37.5 mg tablet Take 1 tablet at night for 2 weeks then 1 tablet twice daily  Indications: GENERALIZED ANXIETY DISORDER, major depressive disorder 60 Tab 5  
 amitriptyline (ELAVIL) 10 mg tablet Take 1 tablet at night for a week then 2 tablets at night for a week then 3 tablets nightly  Indications: MIGRAINE PREVENTION 90 Tab 5  
 butalbital-acetaminophen-caffeine (FIORICET, ESGIC) -40 mg per tablet Take 1 Tab by mouth every six (6) hours as needed for Pain.  Max Daily Amount: 4 Tabs. This medication is only to be filled in one month intervals  Indications: This medication is only to be filled in one month intervals 40 Tab 5  SUMATRIPTAN SUCCINATE (IMITREX PO) Take  by mouth. Past History Past Medical History: 
Past Medical History:  
Diagnosis Date  Anemia  Headache  Hypertension  Neurological disorder Migraines  Other ill-defined conditions(799.89) Palpitations  Psychiatric disorder Anxiety Past Surgical History: 
Past Surgical History:  
Procedure Laterality Date  HX GYN    
 tubal ligation Family History: 
History reviewed. No pertinent family history. Social History: 
Social History Tobacco Use  Smoking status: Former Smoker  Smokeless tobacco: Never Used Substance Use Topics  Alcohol use: Yes Comment: Occassionally  Drug use: No  
 
 
Allergies: 
No Known Allergies Review of Systems Review of Systems Constitutional: Negative for activity change, chills, diaphoresis, fatigue and fever. HENT: Negative for ear discharge, ear pain, hearing loss, nosebleeds, rhinorrhea and voice change. Eyes: Negative for photophobia, pain and visual disturbance. Respiratory: Negative for apnea, cough and shortness of breath. Cardiovascular: Negative for chest pain and leg swelling. Gastrointestinal: Negative for abdominal pain, diarrhea, nausea and vomiting. Genitourinary: Negative for difficulty urinating, dysuria and hematuria. Musculoskeletal: Positive for back pain, myalgias, neck pain and neck stiffness. Negative for arthralgias, gait problem and joint swelling. Skin: Negative. Negative for wound. Neurological: Negative for dizziness, syncope, weakness, light-headedness, numbness and headaches. Psychiatric/Behavioral: Negative. Physical Exam  
Physical Exam  
Constitutional: She is oriented to person, place, and time.  She appears well-developed and well-nourished. No distress. HENT:  
Head: Normocephalic and atraumatic. Right Ear: Hearing and external ear normal.  
Left Ear: Hearing and external ear normal.  
Nose: Nose normal.  
Eyes: Pupils are equal, round, and reactive to light. Conjunctivae and EOM are normal.  
Neck: Normal range of motion and phonation normal. No spinous process tenderness and no muscular tenderness present. No neck rigidity. No edema, no erythema and normal range of motion present. Cardiovascular: Normal rate, regular rhythm, normal heart sounds and intact distal pulses. Pulses: 
     Radial pulses are 2+ on the right side, and 2+ on the left side. Posterior tibial pulses are 2+ on the right side, and 2+ on the left side. Pulmonary/Chest: Effort normal and breath sounds normal. No respiratory distress. Abdominal: Soft. There is no tenderness. Musculoskeletal: Normal range of motion. She exhibits tenderness. She exhibits no edema or deformity. Cervical back: She exhibits pain. She exhibits no tenderness and no bony tenderness. Thoracic back: Normal.  
     Lumbar back: She exhibits pain. She exhibits no tenderness and no bony tenderness. No crepitus, step-off, bony deformity, instability, edema. Neurovascular intact. Straight leg raise negative. Neurological: She is alert and oriented to person, place, and time. No cranial nerve deficit. Skin: Skin is warm and dry. She is not diaphoretic. Psychiatric: She has a normal mood and affect. Her behavior is normal. Judgment and thought content normal.  
Nursing note and vitals reviewed. Diagnostic Study Results Labs - No results found for this or any previous visit (from the past 12 hour(s)). Radiologic Studies -  
XR SPINE CERV 4 OR 5 V Final Result IMPRESSION:  
No acute fracture. Mild degenerative disc disease at C4-C5 and C5-C6. XR SPINE LUMB 2 OR 3 V Final Result Impression: 1. L1 compression fracture, new since the prior study, but otherwise age  
indeterminate. 2. Mild multilevel degenerative spondylosis. CT Results  (Last 48 hours) None CXR Results  (Last 48 hours) None Medical Decision Making I am the first provider for this patient. I reviewed the vital signs, available nursing notes, past medical history, past surgical history, family history and social history. Vital Signs-Reviewed the patient's vital signs. Patient Vitals for the past 12 hrs: 
 Temp Pulse Resp BP SpO2  
04/13/19 1240 97.8 °F (36.6 °C) 76 15 (!) 155/101 100 % Pulse Oximetry Analysis - 100% on RA Records Reviewed: Nursing Notes, Old Medical Records, Previous Radiology Studies and Previous Laboratory Studies Provider Notes (Medical Decision Making): The patient complains of left sided neck and low back pain x \"months\". These symptoms are consistent with a cervical and lumbar strain/ spasm. Less likely  pathology, aortic dissection or AAA, or cauda equina given that there are no red flags and a benign physical exam.  
 
I have recommended rest, avoiding heavy lifting until better, use of intermittent heat (avoid sleeping on a heating pad), and use of OTC NSAID's (Advil, Aleve etc) or Tylenol prn for pain. Call PCP if back pain persists or she develops leg symptoms. It has also been explained that this may take up to three months to fully resolved and that smoking may slow that process even more. HYPERTENSION COUNSELING: 
Patient denies chest pain, headache, shortness of breath. Patient is made aware of their elevated blood pressure and is instructed to follow up this week with their Primary Care for a recheck. Patient is counseled regarding consequences of chronic, uncontrolled hypertension including kidney disease, heart disease, stroke or even death. Patient states their understanding. ED Course: Initial assessment performed. The patients presenting problems have been discussed, and they are in agreement with the care plan formulated and outlined with them. I have encouraged them to ask questions as they arise throughout their visit. 1:38 PM 
No recent injuries. Compression fracture of unknown time. Plan to follow up with Ortho. Critical Care Time:  
0 Disposition: 
1:38 PM 
I have discussed with patient their diagnosis, treatment, and follow up plan. The patient agrees to follow up as outlined in discharge paperwork and also to return to the ED with any worsening. Bethanie De Los Santos PA-C 
 
PLAN: 
1. Current Discharge Medication List  
  
START taking these medications Details  
naproxen (NAPROSYN) 500 mg tablet Take 1 Tab by mouth two (2) times daily as needed for Pain. Qty: 20 Tab, Refills: 0  
  
methocarbamol (ROBAXIN) 500 mg tablet Take 1 Tab by mouth four (4) times daily. Qty: 15 Tab, Refills: 0  
  
acetaminophen (TYLENOL) 500 mg tablet Take 2 Tabs by mouth every six (6) hours as needed for Pain. Qty: 20 Tab, Refills: 0 CONTINUE these medications which have NOT CHANGED Details LORazepam (ATIVAN) 0.5 mg tablet Take 1 Tab by mouth every eight (8) hours as needed for Anxiety. Max Daily Amount: 1.5 mg. 
Qty: 10 Tab, Refills: 0  
  
venlafaxine (EFFEXOR) 37.5 mg tablet Take 1 tablet at night for 2 weeks then 1 tablet twice daily  Indications: GENERALIZED ANXIETY DISORDER, major depressive disorder 
Qty: 60 Tab, Refills: 5  
  
amitriptyline (ELAVIL) 10 mg tablet Take 1 tablet at night for a week then 2 tablets at night for a week then 3 tablets nightly  Indications: MIGRAINE PREVENTION Qty: 90 Tab, Refills: 5  
  
butalbital-acetaminophen-caffeine (FIORICET, ESGIC) -40 mg per tablet Take 1 Tab by mouth every six (6) hours as needed for Pain. Max Daily Amount: 4 Tabs.  This medication is only to be filled in one month intervals  Indications: This medication is only to be filled in one month intervals Qty: 40 Tab, Refills: 5 SUMATRIPTAN SUCCINATE (IMITREX PO) Take  by mouth. 2.  
Follow-up Information Follow up With Specialties Details Why Contact Info [facility]  Schedule an appointment as soon as possible for a visit in 3 days As needed [address] [facility] 
[phone] [address]   Schedule an appointment as soon as possible for a visit in 3 days As needed [address] [place] [address] 
[phone] [facility]  Schedule an appointment as soon as possible for a visit in 3 days As needed [facility] [address] [id number] [phone] Return to ED if worse Diagnosis Clinical Impression: 1. Degenerative disc disease, cervical   
2. Spondylosis of lumbar spine 3. Closed compression fracture of first lumbar vertebra, initial encounter ([facility] [address] Attestations:

## 2019-04-13 NOTE — ED NOTES
Emergency Department Nursing Plan of Care The Nursing Plan of Care is developed from the Nursing assessment and Emergency Department Attending provider initial evaluation. The plan of care may be reviewed in the ED Provider note. The Plan of Care was developed with the following considerations:  
Patient / Family readiness to learn indicated by:verbalized understanding Persons(s) to be included in education: patient Barriers to Learning/Limitations:No 
 
Signed Giovanna Padilla RN   
4/13/2019   1:25 PM

## 2020-02-09 ENCOUNTER — APPOINTMENT (OUTPATIENT)
Dept: ULTRASOUND IMAGING | Age: 40
End: 2020-02-09
Attending: NURSE PRACTITIONER
Payer: COMMERCIAL

## 2020-02-09 ENCOUNTER — HOSPITAL ENCOUNTER (EMERGENCY)
Age: 40
Discharge: HOME | End: 2020-02-09
Attending: EMERGENCY MEDICINE
Payer: COMMERCIAL

## 2020-02-09 VITALS
RESPIRATION RATE: 16 BRPM | SYSTOLIC BLOOD PRESSURE: 143 MMHG | WEIGHT: 203 LBS | BODY MASS INDEX: 31.86 KG/M2 | HEART RATE: 89 BPM | DIASTOLIC BLOOD PRESSURE: 92 MMHG | HEIGHT: 67 IN | TEMPERATURE: 97.3 F | OXYGEN SATURATION: 100 %

## 2020-02-09 DIAGNOSIS — D25.9 UTERINE LEIOMYOMA, UNSPECIFIED LOCATION: ICD-10-CM

## 2020-02-09 DIAGNOSIS — N30.01 ACUTE CYSTITIS WITH HEMATURIA: Primary | ICD-10-CM

## 2020-02-09 LAB
ALBUMIN SERPL-MCNC: 3.5 G/DL (ref 3.5–5)
ALBUMIN/GLOB SERPL: 1.1 {RATIO} (ref 1.1–2.2)
ALP SERPL-CCNC: 50 U/L (ref 45–117)
ALT SERPL-CCNC: 10 U/L (ref 12–78)
ANION GAP SERPL CALC-SCNC: 10 MMOL/L (ref 5–15)
APPEARANCE UR: ABNORMAL
AST SERPL-CCNC: 15 U/L (ref 15–37)
BACTERIA URNS QL MICRO: ABNORMAL /HPF
BILIRUB SERPL-MCNC: 0.2 MG/DL (ref 0.2–1)
BILIRUB UR QL: NEGATIVE
BUN SERPL-MCNC: 16 MG/DL (ref 6–20)
BUN/CREAT SERPL: 26 (ref 12–20)
CALCIUM SERPL-MCNC: 8.3 MG/DL (ref 8.5–10.1)
CHLORIDE SERPL-SCNC: 106 MMOL/L (ref 97–108)
CO2 SERPL-SCNC: 23 MMOL/L (ref 21–32)
COLOR UR: ABNORMAL
CREAT SERPL-MCNC: 0.62 MG/DL (ref 0.55–1.02)
EPITH CASTS URNS QL MICRO: ABNORMAL /LPF
ERYTHROCYTE [DISTWIDTH] IN BLOOD BY AUTOMATED COUNT: 19.7 % (ref 11.5–14.5)
GLOBULIN SER CALC-MCNC: 3.1 G/DL (ref 2–4)
GLUCOSE SERPL-MCNC: 82 MG/DL (ref 65–100)
GLUCOSE UR STRIP.AUTO-MCNC: NEGATIVE MG/DL
HCT VFR BLD AUTO: 30.8 % (ref 35–47)
HGB BLD-MCNC: 9.4 G/DL (ref 11.5–16)
HGB UR QL STRIP: ABNORMAL
KETONES UR QL STRIP.AUTO: NEGATIVE MG/DL
LEUKOCYTE ESTERASE UR QL STRIP.AUTO: ABNORMAL
MCH RBC QN AUTO: 23.4 PG (ref 26–34)
MCHC RBC AUTO-ENTMCNC: 30.5 G/DL (ref 30–36.5)
MCV RBC AUTO: 76.6 FL (ref 80–99)
NITRITE UR QL STRIP.AUTO: NEGATIVE
NRBC # BLD: 0 K/UL (ref 0–0.01)
NRBC BLD-RTO: 0 PER 100 WBC
PH UR STRIP: 7 [PH] (ref 5–8)
PLATELET # BLD AUTO: 327 K/UL (ref 150–400)
PMV BLD AUTO: 10.1 FL (ref 8.9–12.9)
POTASSIUM SERPL-SCNC: 4.4 MMOL/L (ref 3.5–5.1)
PROT SERPL-MCNC: 6.6 G/DL (ref 6.4–8.2)
PROT UR STRIP-MCNC: 30 MG/DL
RBC # BLD AUTO: 4.02 M/UL (ref 3.8–5.2)
RBC #/AREA URNS HPF: ABNORMAL /HPF (ref 0–5)
SODIUM SERPL-SCNC: 139 MMOL/L (ref 136–145)
SP GR UR REFRACTOMETRY: 1.02 (ref 1–1.03)
UA: UC IF INDICATED,UAUC: ABNORMAL
UROBILINOGEN UR QL STRIP.AUTO: 1 EU/DL (ref 0.2–1)
WBC # BLD AUTO: 5.6 K/UL (ref 3.6–11)
WBC URNS QL MICRO: ABNORMAL /HPF (ref 0–4)

## 2020-02-09 PROCEDURE — 81001 URINALYSIS AUTO W/SCOPE: CPT

## 2020-02-09 PROCEDURE — 87147 CULTURE TYPE IMMUNOLOGIC: CPT

## 2020-02-09 PROCEDURE — 76856 US EXAM PELVIC COMPLETE: CPT

## 2020-02-09 PROCEDURE — 87086 URINE CULTURE/COLONY COUNT: CPT

## 2020-02-09 PROCEDURE — 99283 EMERGENCY DEPT VISIT LOW MDM: CPT

## 2020-02-09 PROCEDURE — 36415 COLL VENOUS BLD VENIPUNCTURE: CPT

## 2020-02-09 PROCEDURE — 85027 COMPLETE CBC AUTOMATED: CPT

## 2020-02-09 PROCEDURE — 80053 COMPREHEN METABOLIC PANEL: CPT

## 2020-02-09 RX ORDER — NITROFURANTOIN 25; 75 MG/1; MG/1
100 CAPSULE ORAL 2 TIMES DAILY
Qty: 6 CAP | Refills: 0 | Status: SHIPPED | OUTPATIENT
Start: 2020-02-09 | End: 2020-02-12

## 2020-02-09 NOTE — LETTER
[address] EMERGENCY DEPT 
[address] [place] [id number] 
[phone] Work/School Note Date: 2/9/2020 To Whom It May concern: Melita Waite was seen and treated today in the emergency room by the following provider(s): 
Attending Provider: Radha Zazueta MD 
Nurse Practitioner: Rishabh Vale NP. Melita Waite may return to work on 2/11/2020. Sincerely, Vania Izquierdo PA-C

## 2020-02-09 NOTE — LETTER
[address] EMERGENCY DEPT 
[address] [facility] [address] 
[phone] Work/School Note Date: 2/9/2020 To Whom It May concern: Estefany Page was seen and treated today in the emergency room by the following provider(s): 
Attending Provider: Demian Tinajero MD 
Nurse Practitioner: Reta Turcios NP. Estefany Page may return to work on 2/10/2020. Sincerely, Shantel Bell PA-C

## 2020-02-10 NOTE — DISCHARGE INSTRUCTIONS
Patient Education      Patient Education        Uterine Fibroids: Care Instructions  Your Care Instructions    Uterine fibroids are growths in the uterus. Fibroids aren't cancer. Doctors don't know what causes fibroids. Fibroids are very common in women during their childbearing years. Fibroids can grow on the inside of the uterus, in the muscle wall of the uterus, or near the outside wall of the uterus. In some women, fibroids cause painful cramps and heavy periods. In these cases, taking anti-inflammatory medicines, birth control pills, or using an intrauterine device (IUD) often helps decrease symptoms. Sometimes surgery is needed to treat fibroids. But if you are near menopause, you may want to wait and see if your symptoms get better. Most fibroids shrink and go away after menopause, when your menstrual periods stop completely. Follow-up care is a key part of your treatment and safety. Be sure to make and go to all appointments, and call your doctor if you are having problems. It's also a good idea to know your test results and keep a list of the medicines you take. How can you care for yourself at home? · If your doctor gave you medicine, take it as exactly as prescribed. Be safe with medicines. Call your doctor if you think you are having a problem with your medicine. · Take anti-inflammatory medicines for pain. These include ibuprofen (Advil, Motrin) and naproxen (Aleve). Read and follow all instructions on the label. · Use heat, such as a hot water bottle or a heating pad set on low, or a warm bath to relax tense muscles and relieve cramping. Put a thin cloth between the heating pad and your skin. Never go to sleep with a heating pad on. · Lie down and put a pillow under your knees. Or, lie on your side and bring your knees up to your chest. These positions may help relieve belly pain or pressure. · Keep track of how many sanitary pads or tampons you use each day.   · Get at least 30 minutes of exercise on most days of the week. Walking is a good choice. You also may want to do other activities, such as running, swimming, cycling, or playing tennis or team sports. · If you bleed longer than usual or have heavy bleeding, take a daily multivitamin with iron. When should you call for help? Call your doctor now or seek immediate medical care if:    · You have severe vaginal bleeding.     · You have new or worse belly or pelvic pain.    Watch closely for changes in your health, and be sure to contact your doctor if:    · You have unusual vaginal bleeding.     · You do not get better as expected. Where can you learn more? Go to http://more-jackeline.info/. Enter B121 in the search box to learn more about \"Uterine Fibroids: Care Instructions. \"  Current as of: February 19, 2019  Content Version: 12.2  © 3413-0065 [employer]. Care instructions adapted under license by [employer] (which disclaims liability or warranty for this information). If you have questions about a medical condition or this instruction, always ask your healthcare professional. [place] [address] any warranty or liability for your use of this information. Urinary Tract Infection in Women: Care Instructions  Your Care Instructions    A urinary tract infection, or UTI, is a general term for an infection anywhere between the kidneys and the urethra (where urine comes out). Most UTIs are bladder infections. They often cause pain or burning when you urinate. UTIs are caused by bacteria and can be cured with antibiotics. Be sure to complete your treatment so that the infection goes away. Follow-up care is a key part of your treatment and safety. Be sure to make and go to all appointments, and call your doctor if you are having problems. It's also a good idea to know your test results and keep a list of the medicines you take. How can you care for yourself at home?   · Take your antibiotics as directed. Do not stop taking them just because you feel better. You need to take the full course of antibiotics. · Drink extra water and other fluids for the next day or two. This may help wash out the bacteria that are causing the infection. (If you have kidney, heart, or liver disease and have to limit fluids, talk with your doctor before you increase your fluid intake.)  · Avoid drinks that are carbonated or have caffeine. They can irritate the bladder. · Urinate often. Try to empty your bladder each time. · To relieve pain, take a hot bath or lay a heating pad set on low over your lower belly or genital area. Never go to sleep with a heating pad in place. To prevent UTIs  · Drink plenty of water each day. This helps you urinate often, which clears bacteria from your system. (If you have kidney, heart, or liver disease and have to limit fluids, talk with your doctor before you increase your fluid intake.)  · Urinate when you need to. · Urinate right after you have sex. · Change sanitary pads often. · Avoid douches, bubble baths, feminine hygiene sprays, and other feminine hygiene products that have deodorants. · After going to the bathroom, wipe from front to back. When should you call for help? Call your doctor now or seek immediate medical care if:    · Symptoms such as fever, chills, nausea, or vomiting get worse or appear for the first time.     · You have new pain in your back just below your rib cage. This is called flank pain.     · There is new blood or pus in your urine.     · You have any problems with your antibiotic medicine.    Watch closely for changes in your health, and be sure to contact your doctor if:    · You are not getting better after taking an antibiotic for 2 days.     · Your symptoms go away but then come back. Where can you learn more? Go to http://more-jackeline.info/.   Enter Q306 in the search box to learn more about \"Urinary Tract Infection in Women: Care Instructions. \"  Current as of: December 19, 2018  Content Version: 12.2  © 8669-2640 [employer]. Care instructions adapted under license by [employer] (which disclaims liability or warranty for this information). If you have questions about a medical condition or this instruction, always ask your healthcare professional. Marv Oas any warranty or liability for your use of this information.

## 2020-02-10 NOTE — ED NOTES
Pt presents in ER with c/o lower abdominal pain since yesterday started with her cycle. Denies n/v/d,fever,chills. Emergency Department Nursing Plan of Care       The Nursing Plan of Care is developed from the Nursing assessment and Emergency Department Attending provider initial evaluation. The plan of care may be reviewed in the ED Provider note.     The Plan of Care was developed with the following considerations:   Patient / Family readiness to learn indicated by:verbalized understanding  Persons(s) to be included in education: patient  Barriers to Learning/Limitations:No    Signed     Ankit Andino RN    2/9/2020   7:38 PM

## 2020-02-10 NOTE — ED PROVIDER NOTES
EMERGENCY DEPARTMENT HISTORY AND PHYSICAL EXAM    Date: 2/9/2020  Patient Name: ROSELIA KIM    History of Presenting Illness     Chief Complaint   Patient presents with    Abdominal Pain     Pt C/O abdominal pain and feels like something is stabbing her ovaries         History Provided By: Patient    Chief Complaint: RLQ abdominal pain     HPI: ROSELIA KIM is a 44 y.o. female with a PMH of hypertension and anemia, migraine headaches, and anxiety who presents with complaints of RLQ pain that began 2 days ago. Patient states she will experience this pain frequently with her menstrual cycle but it normally resolves once it starts. She states this time the pain has not gone away and she has noticed that she is passing large clots which are abnormal.  She last saw her OB-GYN ~6 months ago. She denies any N/V/D. Last BM was earlier today. No urinary complaints. She reports a hx of a tubal ligation. No other new complaints at this time. NAD    PCP: Farhad Ho MD    Current Outpatient Medications   Medication Sig Dispense Refill    nitrofurantoin, macrocrystal-monohydrate, (MACROBID) 100 mg capsule Take 1 Cap by mouth two (2) times a day for 3 days. 6 Cap 0    naproxen (NAPROSYN) 500 mg tablet Take 1 Tab by mouth two (2) times daily as needed for Pain. 20 Tab 0    methocarbamol (ROBAXIN) 500 mg tablet Take 1 Tab by mouth four (4) times daily. 15 Tab 0    acetaminophen (TYLENOL) 500 mg tablet Take 2 Tabs by mouth every six (6) hours as needed for Pain.  20 Tab 0    venlafaxine (EFFEXOR) 37.5 mg tablet Take 1 tablet at night for 2 weeks then 1 tablet twice daily  Indications: GENERALIZED ANXIETY DISORDER, major depressive disorder 60 Tab 5    amitriptyline (ELAVIL) 10 mg tablet Take 1 tablet at night for a week then 2 tablets at night for a week then 3 tablets nightly  Indications: MIGRAINE PREVENTION 90 Tab 5    butalbital-acetaminophen-caffeine (FIORICET, ESGIC) -40 mg per tablet Take 1 Tab by mouth every six (6) hours as needed for Pain. Max Daily Amount: 4 Tabs. This medication is only to be filled in one month intervals  Indications: This medication is only to be filled in one month intervals 40 Tab 5    SUMATRIPTAN SUCCINATE (IMITREX PO) Take  by mouth.  LORazepam (ATIVAN) 0.5 mg tablet Take 1 Tab by mouth every eight (8) hours as needed for Anxiety. Max Daily Amount: 1.5 mg. 10 Tab 0       Past History     Past Medical History:  Past Medical History:   Diagnosis Date    Anemia     Headache     Hypertension     Neurological disorder     Migraines    Other ill-defined conditions(799.89)     Palpitations    Psychiatric disorder     Anxiety       Past Surgical History:  Past Surgical History:   Procedure Laterality Date    HX GYN      tubal ligation       Family History:  History reviewed. No pertinent family history. Social History:  Social History     Tobacco Use    Smoking status: Former Smoker    Smokeless tobacco: Former User   Substance Use Topics    Alcohol use: Yes     Comment: Occassionally    Drug use: No       Allergies:  No Known Allergies      Review of Systems   Review of Systems   Constitutional: Negative. HENT: Negative. Eyes: Negative. Respiratory: Negative. Cardiovascular: Negative. Gastrointestinal: Positive for abdominal pain. Genitourinary: Positive for pelvic pain and vaginal bleeding. Musculoskeletal: Negative. Skin: Negative. Neurological: Negative. Psychiatric/Behavioral: Negative. Physical Exam     Vitals:    02/09/20 1903 02/09/20 2135   BP: (!) 142/100 (!) 143/92   Pulse: 89    Resp: 16    Temp: 97.3 °F (36.3 °C)    SpO2: 100%    Weight: 92.1 kg (203 lb)    Height: 5' 6.5\" (1.689 m)      Physical Exam  Vitals signs and nursing note reviewed. Constitutional:       General: She is not in acute distress. Appearance: Normal appearance. She is not ill-appearing. HENT:      Head: Normocephalic and atraumatic.       Nose: Nose normal.      Mouth/Throat:      Mouth: Mucous membranes are moist.   Eyes:      Pupils: Pupils are equal, round, and reactive to light. Neck:      Musculoskeletal: Normal range of motion and neck supple. Cardiovascular:      Rate and Rhythm: Normal rate and regular rhythm. Pulses: Normal pulses. Heart sounds: Normal heart sounds. Pulmonary:      Effort: Pulmonary effort is normal.      Breath sounds: Normal breath sounds. Abdominal:      General: Abdomen is flat. Bowel sounds are normal.      Palpations: Abdomen is soft. Tenderness: There is abdominal tenderness in the right lower quadrant. Musculoskeletal: Normal range of motion. Skin:     General: Skin is warm and dry. Neurological:      General: No focal deficit present. Mental Status: She is alert and oriented to person, place, and time. Psychiatric:         Mood and Affect: Mood normal.         Behavior: Behavior normal.           Diagnostic Study Results     Labs -     No results found for this or any previous visit (from the past 12 hour(s)). Radiologic Studies -   US PELV NON OBS   Final Result   IMPRESSION:  Fibroid uterus. Small right ovarian follicle. Nonvisualization of   the left ovary. CT Results  (Last 48 hours)    None        CXR Results  (Last 48 hours)    None            Medical Decision Making   I am the first provider for this patient. I reviewed the vital signs, available nursing notes, past medical history, past surgical history, family history and social history. Provider Notes (Medical Decision Making):   Patient was presents with RLQ abdominal pain. DDx: Acute cystitis, pyleonephritis, ureteral stone, STI. Will obtain UA and US     Discussed with the patient diagnosis and test results and all questioned fully answered. They understand the importance of staying well hydrated, taking antibiotics as prescribed to completion and using OTC pyridium as desired.   She will PCP if any problems arise. Vital Signs-Reviewed the patient's vital signs. Records Reviewed: Nursing Notes, Old Medical Records and Previous Laboratory Studies    ED Course as of Feb 10 1854   [facility] Feb 09, 2020 2132 Patient updated on UA and CBC results. Explained that [address] has not resulted yet. No new complaints at this time. [HM]   3364 Pt resting comfortably in room in NAD. No new symptoms or complaints at this time. Available labs/ results reviewed with pt.    [SM]      ED Course User Index  [HM] Annetta Hollins NP  [] Sury Unger PA-C          Disposition:  Home     DISCHARGE NOTE:       Care plan outlined and precautions discussed. Patient has no new complaints, changes, or physical findings. Results of labs and US were reviewed with the patient. All medications were reviewed with the patient; will d/c home with RX. All of pt's questions and concerns were addressed. Patient was instructed and agrees to follow up with PCP, as well as to return to the ED upon further deterioration. Patient is ready to go home. Follow-up Information     Follow up With Specialties Details Why Contact Info    Lloyd Bello MD [facility] Schedule an appointment as soon as possible for a visit   Padmaja Bazan [address]  [phone]            Discharge Medication List as of 2/9/2020 10:20 PM      START taking these medications    Details   nitrofurantoin, macrocrystal-monohydrate, (MACROBID) 100 mg capsule Take 1 Cap by mouth two (2) times a day for 3 days. , Print, Disp-6 Cap, R-0         CONTINUE these medications which have NOT CHANGED    Details   naproxen (NAPROSYN) 500 mg tablet Take 1 Tab by mouth two (2) times daily as needed for Pain., Print, Disp-20 Tab, R-0      methocarbamol (ROBAXIN) 500 mg tablet Take 1 Tab by mouth four (4) times daily. , Print, Disp-15 Tab, R-0      acetaminophen (TYLENOL) 500 mg tablet Take 2 Tabs by mouth every six (6) hours as needed for Pain., Print, Disp-20 Tab, R-0      venlafaxine (EFFEXOR) 37.5 mg tablet Take 1 tablet at night for 2 weeks then 1 tablet twice daily  Indications: GENERALIZED ANXIETY DISORDER, major depressive disorder, Normal, Disp-60 Tab, R-5      amitriptyline (ELAVIL) 10 mg tablet Take 1 tablet at night for a week then 2 tablets at night for a week then 3 tablets nightly  Indications: MIGRAINE PREVENTION, Normal, Disp-90 Tab, R-5      butalbital-acetaminophen-caffeine (FIORICET, ESGIC) -40 mg per tablet Take 1 Tab by mouth every six (6) hours as needed for Pain. Max Daily Amount: 4 Tabs. This medication is only to be filled in one month intervals  Indications: This medication is only to be filled in one month intervals, Print, Disp-40 Tab, R-5      SUMATRIPTAN SUCCINATE (IMITREX PO) Take  by mouth., Historical Med      LORazepam (ATIVAN) 0.5 mg tablet Take 1 Tab by mouth every eight (8) hours as needed for Anxiety. Max Daily Amount: 1.5 mg., Print, Disp-10 Tab, R-0                 Procedures:  Procedures    Please note that this dictation was completed with Dragon, computer voice recognition software. Quite often unanticipated grammatical, syntax, homophones, and other interpretive errors are inadvertently transcribed by the computer software. Please disregard these errors. Additionally, please excuse any errors that have escaped final proofreading. Diagnosis     Clinical Impression:   1. Acute cystitis with hematuria    2.  Uterine leiomyoma, unspecified location

## 2020-02-11 LAB
BACTERIA SPEC CULT: ABNORMAL
BACTERIA SPEC CULT: ABNORMAL
CC UR VC: ABNORMAL
SERVICE CMNT-IMP: ABNORMAL

## 2021-02-15 NOTE — ED NOTES
[employer] at bedside
Discharge instructions were given to the patient by [place] L[place] RN. Patient was given 1 prescriptions and was encouraged to call or return to the ED for worsening issues or problems and was encouraged to schedule a follow up appointment for continuing care. Patient given a current medication reconciliation form and verbalized understanding of their medications and importance of discussing medications at follow-up. The patient verbalized understanding of discharge instructions and prescriptions, all questions were answered. The patient has no further concerns at this time. Patient stable at time of discharge. The patient left the Emergency Department ambulatory, with grandmother, and in no acute distress. Patient declined wheelchair transport out of Emergency Department.
Pt arrived to ED c/o chest tightness, migraine, and bilateral \"tingling\" in hands. Pt placed in gown and on continuous cardiac/resp monitoring. Tech at bedside to obtain EKG. Emergency Department Nursing Plan of Care       The Nursing Plan of Care is developed from the Nursing assessment and Emergency Department Attending provider initial evaluation. The plan of care may be reviewed in the ED Provider note.     The Plan of Care was developed with the following considerations:   Patient / Family readiness to learn indicated by:verbalized understanding  Persons(s) to be included in education: patient  Barriers to Learning/Limitations:No    Signed     Spike Wade RN    4/1/2017   8:05 AM
[employer]

## 2021-08-12 ENCOUNTER — HOSPITAL ENCOUNTER (EMERGENCY)
Age: 41
Discharge: LEFT BEFORE DISCHARGE | End: 2021-08-12
Attending: EMERGENCY MEDICINE
Payer: MEDICAID

## 2021-08-12 VITALS
BODY MASS INDEX: 30.61 KG/M2 | HEART RATE: 82 BPM | DIASTOLIC BLOOD PRESSURE: 110 MMHG | WEIGHT: 195 LBS | HEIGHT: 67 IN | RESPIRATION RATE: 20 BRPM | OXYGEN SATURATION: 100 % | SYSTOLIC BLOOD PRESSURE: 145 MMHG | TEMPERATURE: 98 F

## 2021-08-12 PROCEDURE — 75810000275 HC EMERGENCY DEPT VISIT NO LEVEL OF CARE

## 2021-08-12 NOTE — ED TRIAGE NOTES
C/o episode of vomiting due to GERD after fast food lunch yesterday. Patient has been on omeprazole for 3 weeks.

## 2021-08-13 ENCOUNTER — HOSPITAL ENCOUNTER (EMERGENCY)
Age: 41
Discharge: HOME | End: 2021-08-13
Attending: EMERGENCY MEDICINE
Payer: MEDICAID

## 2021-08-13 ENCOUNTER — APPOINTMENT (OUTPATIENT)
Dept: CT IMAGING | Age: 41
End: 2021-08-13
Attending: NURSE PRACTITIONER
Payer: MEDICAID

## 2021-08-13 VITALS
RESPIRATION RATE: 16 BRPM | SYSTOLIC BLOOD PRESSURE: 136 MMHG | BODY MASS INDEX: 30.54 KG/M2 | HEART RATE: 95 BPM | WEIGHT: 195 LBS | OXYGEN SATURATION: 100 % | DIASTOLIC BLOOD PRESSURE: 91 MMHG | TEMPERATURE: 99.2 F

## 2021-08-13 DIAGNOSIS — Z86.2 HISTORY OF ANEMIA: ICD-10-CM

## 2021-08-13 DIAGNOSIS — K29.00 ACUTE GASTRITIS WITHOUT HEMORRHAGE, UNSPECIFIED GASTRITIS TYPE: Primary | ICD-10-CM

## 2021-08-13 LAB
ALBUMIN SERPL-MCNC: 3.5 G/DL (ref 3.5–5)
ALBUMIN/GLOB SERPL: 1 {RATIO} (ref 1.1–2.2)
ALP SERPL-CCNC: 45 U/L (ref 45–117)
ALT SERPL-CCNC: 17 U/L (ref 12–78)
ANION GAP SERPL CALC-SCNC: 8 MMOL/L (ref 5–15)
APPEARANCE UR: CLEAR
AST SERPL-CCNC: 13 U/L (ref 15–37)
BACTERIA URNS QL MICRO: NEGATIVE /HPF
BASOPHILS # BLD: 0.1 K/UL (ref 0–0.1)
BASOPHILS NFR BLD: 1 % (ref 0–1)
BILIRUB SERPL-MCNC: 0.2 MG/DL (ref 0.2–1)
BILIRUB UR QL CFM: NEGATIVE
BUN SERPL-MCNC: 18 MG/DL (ref 6–20)
BUN/CREAT SERPL: 20 (ref 12–20)
CALCIUM SERPL-MCNC: 8.7 MG/DL (ref 8.5–10.1)
CHLORIDE SERPL-SCNC: 103 MMOL/L (ref 97–108)
CO2 SERPL-SCNC: 28 MMOL/L (ref 21–32)
COLOR UR: ABNORMAL
CREAT SERPL-MCNC: 0.91 MG/DL (ref 0.55–1.02)
DIFFERENTIAL METHOD BLD: ABNORMAL
EOSINOPHIL # BLD: 0 K/UL (ref 0–0.4)
EOSINOPHIL NFR BLD: 0 % (ref 0–7)
EPITH CASTS URNS QL MICRO: ABNORMAL /LPF
ERYTHROCYTE [DISTWIDTH] IN BLOOD BY AUTOMATED COUNT: 22.8 % (ref 11.5–14.5)
GLOBULIN SER CALC-MCNC: 3.4 G/DL (ref 2–4)
GLUCOSE SERPL-MCNC: 92 MG/DL (ref 65–100)
GLUCOSE UR STRIP.AUTO-MCNC: NEGATIVE MG/DL
HCT VFR BLD AUTO: 32.1 % (ref 35–47)
HGB BLD-MCNC: 9.9 G/DL (ref 11.5–16)
HGB UR QL STRIP: NEGATIVE
IMM GRANULOCYTES # BLD AUTO: 0 K/UL (ref 0–0.04)
IMM GRANULOCYTES NFR BLD AUTO: 0 % (ref 0–0.5)
KETONES UR QL STRIP.AUTO: ABNORMAL MG/DL
LEUKOCYTE ESTERASE UR QL STRIP.AUTO: NEGATIVE
LIPASE SERPL-CCNC: 152 U/L (ref 73–393)
LYMPHOCYTES # BLD: 2 K/UL (ref 0.8–3.5)
LYMPHOCYTES NFR BLD: 34 % (ref 12–49)
MCH RBC QN AUTO: 22.7 PG (ref 26–34)
MCHC RBC AUTO-ENTMCNC: 30.8 G/DL (ref 30–36.5)
MCV RBC AUTO: 73.6 FL (ref 80–99)
MONOCYTES # BLD: 0.7 K/UL (ref 0–1)
MONOCYTES NFR BLD: 11 % (ref 5–13)
NEUTS SEG # BLD: 3.2 K/UL (ref 1.8–8)
NEUTS SEG NFR BLD: 54 % (ref 32–75)
NITRITE UR QL STRIP.AUTO: NEGATIVE
NRBC # BLD: 0 K/UL (ref 0–0.01)
NRBC BLD-RTO: 0 PER 100 WBC
PH UR STRIP: 6.5 [PH] (ref 5–8)
PLATELET # BLD AUTO: 283 K/UL (ref 150–400)
PMV BLD AUTO: 9.4 FL (ref 8.9–12.9)
POTASSIUM SERPL-SCNC: 4.2 MMOL/L (ref 3.5–5.1)
PROT SERPL-MCNC: 6.9 G/DL (ref 6.4–8.2)
PROT UR STRIP-MCNC: ABNORMAL MG/DL
RBC # BLD AUTO: 4.36 M/UL (ref 3.8–5.2)
RBC #/AREA URNS HPF: ABNORMAL /HPF (ref 0–5)
RBC MORPH BLD: ABNORMAL
RBC MORPH BLD: ABNORMAL
SODIUM SERPL-SCNC: 139 MMOL/L (ref 136–145)
SP GR UR REFRACTOMETRY: 1.02 (ref 1–1.03)
UA: UC IF INDICATED,UAUC: ABNORMAL
UROBILINOGEN UR QL STRIP.AUTO: 1 EU/DL (ref 0.2–1)
WBC # BLD AUTO: 6 K/UL (ref 3.6–11)
WBC URNS QL MICRO: ABNORMAL /HPF (ref 0–4)

## 2021-08-13 PROCEDURE — 74011000636 HC RX REV CODE- 636: Performed by: EMERGENCY MEDICINE

## 2021-08-13 PROCEDURE — 85025 COMPLETE CBC W/AUTO DIFF WBC: CPT

## 2021-08-13 PROCEDURE — 74177 CT ABD & PELVIS W/CONTRAST: CPT

## 2021-08-13 PROCEDURE — 36415 COLL VENOUS BLD VENIPUNCTURE: CPT

## 2021-08-13 PROCEDURE — 96374 THER/PROPH/DIAG INJ IV PUSH: CPT

## 2021-08-13 PROCEDURE — 81001 URINALYSIS AUTO W/SCOPE: CPT

## 2021-08-13 PROCEDURE — 80053 COMPREHEN METABOLIC PANEL: CPT

## 2021-08-13 PROCEDURE — 99283 EMERGENCY DEPT VISIT LOW MDM: CPT

## 2021-08-13 PROCEDURE — 83690 ASSAY OF LIPASE: CPT

## 2021-08-13 PROCEDURE — 74011250636 HC RX REV CODE- 250/636: Performed by: NURSE PRACTITIONER

## 2021-08-13 RX ORDER — ONDANSETRON 4 MG/1
4 TABLET, ORALLY DISINTEGRATING ORAL
Qty: 20 TABLET | Refills: 0 | Status: SHIPPED | OUTPATIENT
Start: 2021-08-13

## 2021-08-13 RX ORDER — AMLODIPINE BESYLATE 5 MG/1
5 TABLET ORAL DAILY
COMMUNITY

## 2021-08-13 RX ORDER — POLYETHYLENE GLYCOL 3350 17 G/17G
17 POWDER, FOR SOLUTION ORAL DAILY
Qty: 116 G | Refills: 0 | Status: SHIPPED | OUTPATIENT
Start: 2021-08-13

## 2021-08-13 RX ORDER — SODIUM CHLORIDE 0.9 % (FLUSH) 0.9 %
5-40 SYRINGE (ML) INJECTION AS NEEDED
Status: DISCONTINUED | OUTPATIENT
Start: 2021-08-13 | End: 2021-08-14 | Stop reason: HOSPADM

## 2021-08-13 RX ORDER — CETIRIZINE HCL 10 MG
10 TABLET ORAL DAILY
COMMUNITY

## 2021-08-13 RX ORDER — FAMOTIDINE 10 MG/ML
20 INJECTION INTRAVENOUS
Status: COMPLETED | OUTPATIENT
Start: 2021-08-13 | End: 2021-08-13

## 2021-08-13 RX ORDER — DIAZEPAM 2 MG/1
2 TABLET ORAL
COMMUNITY

## 2021-08-13 RX ORDER — SODIUM CHLORIDE 0.9 % (FLUSH) 0.9 %
5-40 SYRINGE (ML) INJECTION EVERY 8 HOURS
Status: DISCONTINUED | OUTPATIENT
Start: 2021-08-13 | End: 2021-08-14 | Stop reason: HOSPADM

## 2021-08-13 RX ORDER — OMEPRAZOLE 40 MG/1
40 CAPSULE, DELAYED RELEASE ORAL DAILY
COMMUNITY

## 2021-08-13 RX ORDER — ERGOCALCIFEROL 1.25 MG/1
50000 CAPSULE ORAL
COMMUNITY

## 2021-08-13 RX ADMIN — SODIUM CHLORIDE 1000 ML: 9 INJECTION, SOLUTION INTRAVENOUS at 19:01

## 2021-08-13 RX ADMIN — IOPAMIDOL 100 ML: 755 INJECTION, SOLUTION INTRAVENOUS at 20:04

## 2021-08-13 RX ADMIN — FAMOTIDINE 20 MG: 10 INJECTION, SOLUTION INTRAVENOUS at 19:34

## 2021-08-13 NOTE — ED TRIAGE NOTES
Reports epigastric pain \"like my reflux\", N/V. Reports symptoms are \"sporadic\". Reports her medications aren't working this time.

## 2021-08-13 NOTE — LETTER
[address] EMERGENCY DEPT  [address] [place] [address] [phone]    Work/School Note    Date: 8/13/2021    To Whom It May concern:    Tristan Levy was seen and treated today in the emergency room by the following provider(s):  Attending Provider: Jamari Francis MD  Nurse Practitioner: Zachery Delgado NP. Tristan Levy may return to work on august 16 2021.     Sincerely,          Miguel Angel Coronel NP

## 2021-08-13 NOTE — ED NOTES
Bedside and Verbal shift change report given to Lloyd Bell RN (oncoming nurse) by Fracisco Falocn RN (offgoing nurse). Report included the following information SBAR.

## 2021-08-14 NOTE — ED NOTES
Patient here with c/o abdominal and epigastric pains. Patient states ongoing problems with GI symptoms. Patient states that she has been seen by her doctor and states that she was started on Omeprazole however patient states that she does not feel as though her medications are working. Patient states that her mother has suffered with similar problems and has had to adjust her diet, however patient states that she herself does not eat well, reporting friend foods and other poor diet choices. Patient denies fevers. Patient states that she has had some vomiting recently. Patient also states that she has felt constipated after vomiting, however stating that she has been passing lots of gas. Emergency Department Nursing Plan of Care       The Nursing Plan of Care is developed from the Nursing assessment and Emergency Department Attending provider initial evaluation. The plan of care may be reviewed in the ED Provider note.     The Plan of Care was developed with the following considerations:   Patient / Family readiness to learn indicated by:verbalized understanding  Persons(s) to be included in education: patient  Barriers to Learning/Limitations:No    Signed     Promise Erwin RN    8/13/2021   8:09 PM

## 2021-08-14 NOTE — ED PROVIDER NOTES
EMERGENCY DEPARTMENT HISTORY AND PHYSICAL EXAM    Date: 8/13/2021  Patient Name: ROSELIA KIM    History of Presenting Illness     Chief Complaint   Patient presents with    Epigastric Pain         History Provided By: Patient    Chief Complaint: Epigastric pain  Duration: 3 Days  Timing:  Acute  Location: epigastic area  Quality: Burning  Severity: Moderate  Modifying Factors: worse after vomiting  Associated Symptoms: Constipation      HPI: ROSELIA KIM is a 36 y.o. female with a PMH of anemia hypertension headache and as below who presents with gastric pain for the past few days. Patient states she has had epigastric pain in the past but now it she is having it more frequently. Patient states she vomits and after she vomits she becomes constipated. Patient denies fever dysuria. She denies history of GI problems. PCP: Alesia Stubbs MD    Current Facility-Administered Medications   Medication Dose Route Frequency Provider Last Rate Last Admin    sodium chloride (NS) flush 5-40 mL  5-40 mL IntraVENous Q8H Maggie Galaviz NP        sodium chloride (NS) flush 5-40 mL  5-40 mL IntraVENous PRN Jourdan Isidro NP         Current Outpatient Medications   Medication Sig Dispense Refill    ondansetron (Zofran ODT) 4 mg disintegrating tablet 1 Tablet by SubLINGual route every six (6) hours as needed for Nausea or Vomiting. 20 Tablet 0    polyethylene glycol (Miralax) 17 gram/dose powder Take 17 g by mouth daily. 1 tablespoon with 8 oz of water daily 116 g 0    omeprazole (PRILOSEC) 40 mg capsule Take 40 mg by mouth daily.  ergocalciferol (ERGOCALCIFEROL) 1,250 mcg (50,000 unit) capsule Take 50,000 Units by mouth.  cetirizine (ZYRTEC) 10 mg tablet Take 10 mg by mouth daily.  amLODIPine (NORVASC) 5 mg tablet Take 5 mg by mouth daily.  diazePAM (VALIUM) 2 mg tablet Take 2 mg by mouth every twelve (12) hours as needed for Anxiety.       naproxen (NAPROSYN) 500 mg tablet Take 1 Tab by mouth two (2) times daily as needed for Pain. 20 Tab 0    methocarbamol (ROBAXIN) 500 mg tablet Take 1 Tab by mouth four (4) times daily. 15 Tab 0    acetaminophen (TYLENOL) 500 mg tablet Take 2 Tabs by mouth every six (6) hours as needed for Pain. 20 Tab 0    venlafaxine (EFFEXOR) 37.5 mg tablet Take 1 tablet at night for 2 weeks then 1 tablet twice daily  Indications: GENERALIZED ANXIETY DISORDER, major depressive disorder 60 Tab 5    amitriptyline (ELAVIL) 10 mg tablet Take 1 tablet at night for a week then 2 tablets at night for a week then 3 tablets nightly  Indications: MIGRAINE PREVENTION 90 Tab 5    butalbital-acetaminophen-caffeine (FIORICET, ESGIC) -40 mg per tablet Take 1 Tab by mouth every six (6) hours as needed for Pain. Max Daily Amount: 4 Tabs. This medication is only to be filled in one month intervals  Indications: This medication is only to be filled in one month intervals 40 Tab 5    SUMATRIPTAN SUCCINATE (IMITREX PO) Take  by mouth.  LORazepam (ATIVAN) 0.5 mg tablet Take 1 Tab by mouth every eight (8) hours as needed for Anxiety. Max Daily Amount: 1.5 mg. 10 Tab 0       Past History     Past Medical History:  Past Medical History:   Diagnosis Date    Anemia     Headache     Hypertension     Neurological disorder     Migraines    Other ill-defined conditions(9.89)     Palpitations    Psychiatric disorder     Anxiety       Past Surgical History:  Past Surgical History:   Procedure Laterality Date    HX GYN      tubal ligation       Family History:  History reviewed. No pertinent family history. Social History:  Social History     Tobacco Use    Smoking status: Current Some Day Smoker    Smokeless tobacco: Former User   Substance Use Topics    Alcohol use: Yes     Comment: social Occassionally    Drug use: Yes     Types: Marijuana       Allergies:  No Known Allergies      Review of Systems   Review of Systems   Constitutional: Negative for fatigue and fever. Respiratory: Negative for shortness of breath and wheezing. Cardiovascular: Negative for chest pain. Gastrointestinal: Positive for abdominal pain and constipation. Musculoskeletal: Negative for arthralgias, myalgias, neck pain and neck stiffness. Skin: Negative for pallor and rash. Neurological: Negative for dizziness, tremors and headaches. All other systems reviewed and are negative. Physical Exam     Vitals:    08/13/21 1727 08/13/21 1840   BP: (!) 136/91    Pulse: 95    Resp: 16    Temp:  99.2 °F (37.3 °C)   SpO2: 100%    Weight: 88.5 kg (195 lb)      Physical Exam  Vitals and nursing note reviewed. Constitutional:       General: She is not in acute distress. Appearance: She is well-developed. HENT:      Head: Normocephalic and atraumatic. Right Ear: External ear normal.      Left Ear: External ear normal.      Nose: Nose normal.   Eyes:      Conjunctiva/sclera: Conjunctivae normal.   Cardiovascular:      Rate and Rhythm: Normal rate and regular rhythm. Heart sounds: Normal heart sounds. Pulmonary:      Effort: Pulmonary effort is normal. No respiratory distress. Breath sounds: Normal breath sounds. No wheezing. Abdominal:      General: Bowel sounds are normal.      Palpations: Abdomen is soft. Tenderness: There is abdominal tenderness. Comments: Epigastric   Musculoskeletal:         General: Normal range of motion. Cervical back: Normal range of motion and neck supple. Lymphadenopathy:      Cervical: No cervical adenopathy. Skin:     General: Skin is warm and dry. Findings: No rash. Neurological:      Mental Status: She is alert and oriented to person, place, and time. Cranial Nerves: No cranial nerve deficit. Coordination: Coordination normal.   Psychiatric:         Behavior: Behavior normal.         Thought Content:  Thought content normal.         Judgment: Judgment normal.           Diagnostic Study Results     Labs - Recent Results (from the past 12 hour(s))   METABOLIC PANEL, COMPREHENSIVE    Collection Time: 08/13/21  6:52 PM   Result Value Ref Range    Sodium 139 136 - 145 mmol/L    Potassium 4.2 3.5 - 5.1 mmol/L    Chloride 103 97 - 108 mmol/L    CO2 28 21 - 32 mmol/L    Anion gap 8 5 - 15 mmol/L    Glucose 92 65 - 100 mg/dL    BUN 18 6 - 20 MG/DL    Creatinine 0.91 0.55 - 1.02 MG/DL    BUN/Creatinine ratio 20 12 - 20      GFR est AA >60 >60 ml/min/1.73m2    GFR est non-AA >60 >60 ml/min/1.73m2    Calcium 8.7 8.5 - 10.1 MG/DL    Bilirubin, total 0.2 0.2 - 1.0 MG/DL    ALT (SGPT) 17 12 - 78 U/L    AST (SGOT) 13 (L) 15 - 37 U/L    Alk. phosphatase 45 45 - 117 U/L    Protein, total 6.9 6.4 - 8.2 g/dL    Albumin 3.5 3.5 - 5.0 g/dL    Globulin 3.4 2.0 - 4.0 g/dL    A-G Ratio 1.0 (L) 1.1 - 2.2     CBC WITH AUTOMATED DIFF    Collection Time: 08/13/21  6:52 PM   Result Value Ref Range    WBC 6.0 3.6 - 11.0 K/uL    RBC 4.36 3.80 - 5.20 M/uL    HGB 9.9 (L) 11.5 - 16.0 g/dL    HCT 32.1 (L) 35.0 - 47.0 %    MCV 73.6 (L) 80.0 - 99.0 FL    MCH 22.7 (L) 26.0 - 34.0 PG    MCHC 30.8 30.0 - 36.5 g/dL    RDW 22.8 (H) 11.5 - 14.5 %    PLATELET 462 880 - 043 K/uL    MPV 9.4 8.9 - 12.9 FL    NRBC 0.0 0  WBC    ABSOLUTE NRBC 0.00 0.00 - 0.01 K/uL    NEUTROPHILS 54 32 - 75 %    LYMPHOCYTES 34 12 - 49 %    MONOCYTES 11 5 - 13 %    EOSINOPHILS 0 0 - 7 %    BASOPHILS 1 0 - 1 %    IMMATURE GRANULOCYTES 0 0.0 - 0.5 %    ABS. NEUTROPHILS 3.2 1.8 - 8.0 K/UL    ABS. LYMPHOCYTES 2.0 0.8 - 3.5 K/UL    ABS. MONOCYTES 0.7 0.0 - 1.0 K/UL    ABS. EOSINOPHILS 0.0 0.0 - 0.4 K/UL    ABS. BASOPHILS 0.1 0.0 - 0.1 K/UL    ABS. IMM.  GRANS. 0.0 0.00 - 0.04 K/UL    DF SMEAR SCANNED      RBC COMMENTS TARGET CELLS  1+        RBC COMMENTS ANISOCYTOSIS  1+       URINALYSIS W/ REFLEX CULTURE    Collection Time: 08/13/21  6:52 PM    Specimen: Miscellaneous sample    Urine specimen   Result Value Ref Range    Color DARK YELLOW      Appearance CLEAR CLEAR      Specific gravity 1.025 1.003 - 1.030      pH (UA) 6.5 5.0 - 8.0      Protein TRACE (A) NEG mg/dL    Glucose Negative NEG mg/dL    Ketone TRACE (A) NEG mg/dL    Blood Negative NEG      Urobilinogen 1.0 0.2 - 1.0 EU/dL    Nitrites Negative NEG      Leukocyte Esterase Negative NEG      WBC 5-10 0 - 4 /hpf    RBC 0-5 0 - 5 /hpf    Epithelial cells MODERATE (A) FEW /lpf    Bacteria Negative NEG /hpf    UA:UC IF INDICATED CULTURE NOT INDICATED BY UA RESULT CNI     LIPASE    Collection Time: 08/13/21  6:52 PM   Result Value Ref Range    Lipase 152 73 - 393 U/L   BILIRUBIN, CONFIRM    Collection Time: 08/13/21  6:52 PM   Result Value Ref Range    Bilirubin UA, confirm Negative NEG         Radiologic Studies -   CT ABD PELV W CONT   Final Result      1. No acute abdominal or pelvic abnormality. 2. Tiny free fluid in the pelvis likely physiologic. 3. Other incidental findings. CT Results  (Last 48 hours)               08/13/21 2004  CT ABD PELV W CONT Final result    Impression:      1. No acute abdominal or pelvic abnormality. 2. Tiny free fluid in the pelvis likely physiologic. 3. Other incidental findings. Narrative:  EXAM: CT ABD PELV W CONT       INDICATION: Abdominal pain vomiting       COMPARISON: Lumbar spine 4/13/2019        CONTRAST: Not specified. mL of Isovue-370. TECHNIQUE:    Following the uneventful intravenous administration of contrast, thin axial   images were obtained through the abdomen and pelvis. Coronal and sagittal   reconstructions were generated. Oral contrast was not administered. CT dose   reduction was achieved through use of a standardized protocol tailored for this   examination and automatic exposure control for dose modulation. FINDINGS:    LOWER THORAX: No significant abnormality in the incidentally imaged lower chest.   LIVER: No mass. BILIARY TREE: Gallbladder is within normal limits. CBD is not dilated. SPLEEN: within normal limits.    PANCREAS: No mass or ductal dilatation. ADRENALS: Unremarkable. KIDNEYS: No mass, calculus, or hydronephrosis. STOMACH: Unremarkable. SMALL BOWEL: No dilatation or wall thickening. COLON: No dilatation or wall thickening. APPENDIX: Normal   PERITONEUM: Tiny free fluid in the pelvis likely physiologic. RETROPERITONEUM: No lymphadenopathy or aortic aneurysm. REPRODUCTIVE ORGANS: Mild heterogeneity of the uterus, possible leiomyomata. Bilateral adnexal clips suggesting bilateral tubal ligation. URINARY BLADDER: Incompletely distended and not well assessed. BONES: No destructive bone lesion. L1 compression deformity nonacute, stable   since 4/13/2019. ABDOMINAL WALL: No mass or hernia. ADDITIONAL COMMENTS: N/A               CXR Results  (Last 48 hours)    None            Medical Decision Making   I am the first provider for this patient. I reviewed the vital signs, available nursing notes, past medical history, past surgical history, family history and social history. Vital Signs-Reviewed the patient's vital signs. Records Reviewed: Nursing Notes    Provider Notes (Medical Decision Making):   DDX dyspepsia GERD peptic ulcer disease gastritis duodenitis          Disposition:  home    DISCHARGE NOTE:         Care plan outlined and precautions discussed. Patient has no new complaints, changes, or physical findings. Results of tests were reviewed with the patient. Advised of L1 compression which she stated she knew she had. Patient also advised to leiomyomata. All medications were reviewed with the patient; will d/c home with Zofran MiraLAX. All of pt's questions and concerns were addressed. Patient was instructed and agrees to follow up with GI, as well as to return to the ED upon further deterioration. Patient is ready to go home.     Follow-up Information     Follow up With Specialties Details Why Mary Patel MD Gastroenterology In 1 week  [address] [id number]  [phone]            Current Discharge Medication List      START taking these medications    Details   ondansetron (Zofran ODT) 4 mg disintegrating tablet 1 Tablet by SubLINGual route every six (6) hours as needed for Nausea or Vomiting. Qty: 20 Tablet, Refills: 0  Start date: 8/13/2021      polyethylene glycol (Miralax) 17 gram/dose powder Take 17 g by mouth daily. 1 tablespoon with 8 oz of water daily  Qty: 116 g, Refills: 0  Start date: 8/13/2021             Procedures:  Procedures    Please note that this dictation was completed with Dragon, computer voice recognition software. Quite often unanticipated grammatical, syntax, homophones, and other interpretive errors are inadvertently transcribed by the computer software. Please disregard these errors. Additionally, please excuse any errors that have escaped final proofreading. Diagnosis     Clinical Impression:   1. Acute gastritis without hemorrhage, unspecified gastritis type    2.  History of anemia

## 2022-03-19 PROBLEM — G43.009 MIGRAINE WITHOUT AURA AND WITHOUT STATUS MIGRAINOSUS, NOT INTRACTABLE: Status: ACTIVE | Noted: 2017-09-26

## 2022-12-12 ENCOUNTER — HOSPITAL ENCOUNTER (EMERGENCY)
Age: 42
Discharge: HOME | End: 2022-12-12
Attending: EMERGENCY MEDICINE
Payer: MEDICAID

## 2022-12-12 VITALS
OXYGEN SATURATION: 100 % | HEART RATE: 93 BPM | SYSTOLIC BLOOD PRESSURE: 143 MMHG | RESPIRATION RATE: 18 BRPM | WEIGHT: 195 LBS | DIASTOLIC BLOOD PRESSURE: 97 MMHG | HEIGHT: 67 IN | TEMPERATURE: 98.8 F | BODY MASS INDEX: 30.61 KG/M2

## 2022-12-12 DIAGNOSIS — D64.9 SYMPTOMATIC ANEMIA: Primary | ICD-10-CM

## 2022-12-12 LAB
ABO + RH BLD: NORMAL
ALBUMIN SERPL-MCNC: 4 G/DL (ref 3.5–5)
ALBUMIN/GLOB SERPL: 1.3 {RATIO} (ref 1.1–2.2)
ALP SERPL-CCNC: 49 U/L (ref 45–117)
ALT SERPL-CCNC: 24 U/L (ref 12–78)
ANION GAP SERPL CALC-SCNC: 8 MMOL/L (ref 5–15)
APTT PPP: 22.9 SEC (ref 22.1–31)
AST SERPL-CCNC: 27 U/L (ref 15–37)
BASOPHILS # BLD: 0.1 K/UL (ref 0–0.1)
BASOPHILS NFR BLD: 1 % (ref 0–1)
BILIRUB SERPL-MCNC: 0.2 MG/DL (ref 0.2–1)
BLOOD GROUP ANTIBODIES SERPL: NORMAL
BUN SERPL-MCNC: 13 MG/DL (ref 6–20)
BUN/CREAT SERPL: 21 (ref 12–20)
CALCIUM SERPL-MCNC: 8.7 MG/DL (ref 8.5–10.1)
CHLORIDE SERPL-SCNC: 104 MMOL/L (ref 97–108)
CO2 SERPL-SCNC: 27 MMOL/L (ref 21–32)
CREAT SERPL-MCNC: 0.62 MG/DL (ref 0.55–1.02)
DIFFERENTIAL METHOD BLD: ABNORMAL
EOSINOPHIL # BLD: 0 K/UL (ref 0–0.4)
EOSINOPHIL NFR BLD: 0 % (ref 0–7)
ERYTHROCYTE [DISTWIDTH] IN BLOOD BY AUTOMATED COUNT: 21.5 % (ref 11.5–14.5)
GLOBULIN SER CALC-MCNC: 3.2 G/DL (ref 2–4)
GLUCOSE SERPL-MCNC: 78 MG/DL (ref 65–100)
HCT VFR BLD AUTO: 28.3 % (ref 35–47)
HGB BLD-MCNC: 8.8 G/DL (ref 11.5–16)
IMM GRANULOCYTES # BLD AUTO: 0 K/UL (ref 0–0.04)
IMM GRANULOCYTES NFR BLD AUTO: 0 % (ref 0–0.5)
INR PPP: 1 (ref 0.9–1.1)
LYMPHOCYTES # BLD: 2 K/UL (ref 0.8–3.5)
LYMPHOCYTES NFR BLD: 22 % (ref 12–49)
MCH RBC QN AUTO: 22.2 PG (ref 26–34)
MCHC RBC AUTO-ENTMCNC: 31.1 G/DL (ref 30–36.5)
MCV RBC AUTO: 71.5 FL (ref 80–99)
MONOCYTES # BLD: 0.5 K/UL (ref 0–1)
MONOCYTES NFR BLD: 6 % (ref 5–13)
NEUTS SEG # BLD: 6.3 K/UL (ref 1.8–8)
NEUTS SEG NFR BLD: 71 % (ref 32–75)
NRBC # BLD: 0 K/UL (ref 0–0.01)
NRBC BLD-RTO: 0 PER 100 WBC
PLATELET # BLD AUTO: 330 K/UL (ref 150–400)
PMV BLD AUTO: 9 FL (ref 8.9–12.9)
POTASSIUM SERPL-SCNC: 3.9 MMOL/L (ref 3.5–5.1)
PROT SERPL-MCNC: 7.2 G/DL (ref 6.4–8.2)
PROTHROMBIN TIME: 10.4 SEC (ref 9–11.1)
RBC # BLD AUTO: 3.96 M/UL (ref 3.8–5.2)
RBC MORPH BLD: ABNORMAL
SODIUM SERPL-SCNC: 139 MMOL/L (ref 136–145)
SPECIMEN EXP DATE BLD: NORMAL
THERAPEUTIC RANGE,PTTT: NORMAL SECS (ref 58–77)
WBC # BLD AUTO: 8.9 K/UL (ref 3.6–11)

## 2022-12-12 PROCEDURE — 85025 COMPLETE CBC W/AUTO DIFF WBC: CPT

## 2022-12-12 PROCEDURE — 85730 THROMBOPLASTIN TIME PARTIAL: CPT

## 2022-12-12 PROCEDURE — 99284 EMERGENCY DEPT VISIT MOD MDM: CPT

## 2022-12-12 PROCEDURE — 36415 COLL VENOUS BLD VENIPUNCTURE: CPT

## 2022-12-12 PROCEDURE — 80053 COMPREHEN METABOLIC PANEL: CPT

## 2022-12-12 PROCEDURE — 93005 ELECTROCARDIOGRAM TRACING: CPT

## 2022-12-12 PROCEDURE — 86900 BLOOD TYPING SEROLOGIC ABO: CPT

## 2022-12-12 PROCEDURE — 85610 PROTHROMBIN TIME: CPT

## 2022-12-12 RX ORDER — FERROUS SULFATE 325(65) MG
325 TABLET, DELAYED RELEASE (ENTERIC COATED) ORAL
Qty: 60 TABLET | Refills: 1 | Status: SHIPPED | OUTPATIENT
Start: 2022-12-12

## 2022-12-12 NOTE — ED NOTES
Discharge instructions were given to the patient by Dinesh Ray RN. The patient left the Emergency Department ambulatory, alert and oriented and in no acute distress with 1 prescriptions. The patient was encouraged to call or return to the ED for worsening issues or problems and was encouraged to schedule a follow up appointment for continuing care. The patient verbalized understanding of discharge instructions and prescriptions, all questions were answered. The patient has no further concerns at this time.

## 2022-12-12 NOTE — Clinical Note
[address] EMERGENCY DEPT  [address] [facility] [address] [phone]    Work/School Note    Date: 12/12/2022    To Whom It May concern:    Karthikeyan Lan was seen and treated today in the emergency room by the following provider(s):  Attending Provider: Mahesh Mcnair MD.      Karthikeyan Lan is excused from work/school on 12/12/22 and 12/13/22. She is medically clear to return to work/school on 12/14/2022.        Sincerely,          Paula Gannon MD

## 2022-12-12 NOTE — ED TRIAGE NOTES
C/o intermittent dizziness where spots appear in her vision x \"a couple months. \" Pt reports hx of anemia, hemoglobin was 8 at last doctor's appt a few months ago. Pt denies hx of getting blood transfusions.

## 2022-12-12 NOTE — ED PROVIDER NOTES
EMERGENCY DEPARTMENT HISTORY AND PHYSICAL EXAM      Date: 12/12/2022  Patient Name: ROSELIA KIM    History of Presenting Illness     Chief Complaint   Patient presents with    Dizziness     History Provided By: Patient    HPI: ROSELIA KIM, 43 y.o. female with past medical history significant for iron deficiency anemia, hypertension, migraine headaches, and anxiety who presents via private vehicle to the ED with cc of dizziness and lightheadedness that progressively is worsening. Patient states she has a known history of anemia and just finished her menstrual cycle which was heavier than normal.  She saw her primary care doctor on Friday for the symptoms and he has recommended she follow-up with hematology for iron infusions. Patient has been taking oral iron without any improvement of her anemia. She denies any dark/tarry stools. She also denies any chest pain, shortness of breath, nausea, vomiting, or diarrhea. Her dizziness is worse when she is standing for prolonged periods of time and better when she lies down. PMHx: Iron deficiency anemia, hypertension, migraine headaches, and anxiety  Social Hx: Smokes Black and Milds, occasional alcohol use, occasional marijuana use    PCP: Jessica Andrade MD    There are no other complaints, changes, or physical findings at this time. No current facility-administered medications on file prior to encounter. Current Outpatient Medications on File Prior to Encounter   Medication Sig Dispense Refill    amLODIPine (NORVASC) 5 mg tablet Take 5 mg by mouth daily. omeprazole (PRILOSEC) 40 mg capsule Take 40 mg by mouth daily. ergocalciferol (ERGOCALCIFEROL) 1,250 mcg (50,000 unit) capsule Take 50,000 Units by mouth. cetirizine (ZYRTEC) 10 mg tablet Take 10 mg by mouth daily. diazePAM (VALIUM) 2 mg tablet Take 2 mg by mouth every twelve (12) hours as needed for Anxiety.       ondansetron (Zofran ODT) 4 mg disintegrating tablet 1 Tablet by SubLINGual route every six (6) hours as needed for Nausea or Vomiting. 20 Tablet 0    polyethylene glycol (Miralax) 17 gram/dose powder Take 17 g by mouth daily. 1 tablespoon with 8 oz of water daily 116 g 0    methocarbamol (ROBAXIN) 500 mg tablet Take 1 Tab by mouth four (4) times daily. 15 Tab 0    acetaminophen (TYLENOL) 500 mg tablet Take 2 Tabs by mouth every six (6) hours as needed for Pain. 20 Tab 0    [DISCONTINUED] naproxen (NAPROSYN) 500 mg tablet Take 1 Tab by mouth two (2) times daily as needed for Pain. 20 Tab 0    venlafaxine (EFFEXOR) 37.5 mg tablet Take 1 tablet at night for 2 weeks then 1 tablet twice daily  Indications: GENERALIZED ANXIETY DISORDER, major depressive disorder 60 Tab 5    amitriptyline (ELAVIL) 10 mg tablet Take 1 tablet at night for a week then 2 tablets at night for a week then 3 tablets nightly  Indications: MIGRAINE PREVENTION 90 Tab 5    butalbital-acetaminophen-caffeine (FIORICET, ESGIC) -40 mg per tablet Take 1 Tab by mouth every six (6) hours as needed for Pain. Max Daily Amount: 4 Tabs. This medication is only to be filled in one month intervals  Indications: This medication is only to be filled in one month intervals 40 Tab 5    SUMATRIPTAN SUCCINATE (IMITREX PO) Take  by mouth. LORazepam (ATIVAN) 0.5 mg tablet Take 1 Tab by mouth every eight (8) hours as needed for Anxiety. Max Daily Amount: 1.5 mg. 10 Tab 0     Past History     Past Medical History:  Past Medical History:   Diagnosis Date    Anemia     Headache     Hypertension     Neurological disorder     Migraines    Other ill-defined conditions(809.89)     Palpitations    Psychiatric disorder     Anxiety     Past Surgical History:  Past Surgical History:   Procedure Laterality Date    HX GYN      tubal ligation     Family History:  History reviewed. No pertinent family history.   Social History:  Social History     Tobacco Use    Smoking status: Some Days    Smokeless tobacco: Former   Substance Use Topics    Alcohol use: Yes     Comment: social Occassionally    Drug use: Yes     Types: Marijuana     Allergies:  No Known Allergies  Review of Systems   Review of Systems   Constitutional:  Positive for fatigue. Negative for chills and fever. HENT:  Negative for congestion, rhinorrhea, sneezing and sore throat. Eyes:  Negative for redness and visual disturbance. Respiratory:  Negative for shortness of breath. Cardiovascular:  Negative for leg swelling. Gastrointestinal:  Negative for abdominal pain, nausea and vomiting. Genitourinary:  Negative for difficulty urinating and frequency. Musculoskeletal:  Negative for back pain, myalgias and neck stiffness. Skin:  Negative for rash. Neurological:  Positive for dizziness and light-headedness. Negative for syncope, weakness and headaches. Hematological:  Negative for adenopathy. All other systems reviewed and are negative. Physical Exam   Physical Exam  Vitals and nursing note reviewed. Constitutional:       Appearance: Normal appearance. She is well-developed. HENT:      Head: Normocephalic and atraumatic. Eyes:      Conjunctiva/sclera: Conjunctivae normal.   Cardiovascular:      Rate and Rhythm: Normal rate and regular rhythm. Pulses: Normal pulses. Heart sounds: Normal heart sounds, S1 normal and S2 normal.   Pulmonary:      Effort: Pulmonary effort is normal. No respiratory distress. Breath sounds: Normal breath sounds. No wheezing. Abdominal:      General: Bowel sounds are normal. There is no distension. Palpations: Abdomen is soft. Tenderness: There is no abdominal tenderness. There is no rebound. Musculoskeletal:         General: Normal range of motion. Cervical back: Full passive range of motion without pain, normal range of motion and neck supple. Skin:     General: Skin is warm and dry. Findings: No rash. Neurological:      Mental Status: She is alert and oriented to person, place, and time. Psychiatric:         Speech: Speech normal.         Behavior: Behavior normal.         Thought Content: Thought content normal.         Judgment: Judgment normal.     Diagnostic Study Results   Labs -     Recent Results (from the past 12 hour(s))   EKG, 12 LEAD, INITIAL    Collection Time: 12/12/22  2:21 PM   Result Value Ref Range    Ventricular Rate 82 BPM    Atrial Rate 82 BPM    P-R Interval 142 ms    QRS Duration 86 ms    Q-T Interval 392 ms    QTC Calculation (Bezet) 457 ms    Calculated P Axis 78 degrees    Calculated R Axis 82 degrees    Calculated T Axis 84 degrees    Diagnosis       Normal sinus rhythm  Normal ECG  When compared with ECG of 01-APR-2017 07:57,  No significant change was found     CBC WITH AUTOMATED DIFF    Collection Time: 12/12/22  2:36 PM   Result Value Ref Range    WBC 8.9 3.6 - 11.0 K/uL    RBC 3.96 3.80 - 5.20 M/uL    HGB 8.8 (L) 11.5 - 16.0 g/dL    HCT 28.3 (L) 35.0 - 47.0 %    MCV 71.5 (L) 80.0 - 99.0 FL    MCH 22.2 (L) 26.0 - 34.0 PG    MCHC 31.1 30.0 - 36.5 g/dL    RDW 21.5 (H) 11.5 - 14.5 %    PLATELET 104 013 - 540 K/uL    MPV 9.0 8.9 - 12.9 FL    NRBC 0.0 0  WBC    ABSOLUTE NRBC 0.00 0.00 - 0.01 K/uL    NEUTROPHILS 71 32 - 75 %    LYMPHOCYTES 22 12 - 49 %    MONOCYTES 6 5 - 13 %    EOSINOPHILS 0 0 - 7 %    BASOPHILS 1 0 - 1 %    IMMATURE GRANULOCYTES 0 0.0 - 0.5 %    ABS. NEUTROPHILS 6.3 1.8 - 8.0 K/UL    ABS. LYMPHOCYTES 2.0 0.8 - 3.5 K/UL    ABS. MONOCYTES 0.5 0.0 - 1.0 K/UL    ABS. EOSINOPHILS 0.0 0.0 - 0.4 K/UL    ABS. BASOPHILS 0.1 0.0 - 0.1 K/UL    ABS. IMM.  GRANS. 0.0 0.00 - 0.04 K/UL    DF SMEAR SCANNED      RBC COMMENTS ANISOCYTOSIS  2+        RBC COMMENTS HYPOCHROMIA  1+        RBC COMMENTS TARGET CELLS  PRESENT       METABOLIC PANEL, COMPREHENSIVE    Collection Time: 12/12/22  2:36 PM   Result Value Ref Range    Sodium 139 136 - 145 mmol/L    Potassium 3.9 3.5 - 5.1 mmol/L    Chloride 104 97 - 108 mmol/L    CO2 27 21 - 32 mmol/L    Anion gap 8 5 - 15 mmol/L Glucose 78 65 - 100 mg/dL    BUN 13 6 - 20 MG/DL    Creatinine 0.62 0.55 - 1.02 MG/DL    BUN/Creatinine ratio 21 (H) 12 - 20      eGFR >60 >60 ml/min/1.73m2    Calcium 8.7 8.5 - 10.1 MG/DL    Bilirubin, total 0.2 0.2 - 1.0 MG/DL    ALT (SGPT) 24 12 - 78 U/L    AST (SGOT) 27 15 - 37 U/L    Alk. phosphatase 49 45 - 117 U/L    Protein, total 7.2 6.4 - 8.2 g/dL    Albumin 4.0 3.5 - 5.0 g/dL    Globulin 3.2 2.0 - 4.0 g/dL    A-G Ratio 1.3 1.1 - 2.2     PROTHROMBIN TIME + INR    Collection Time: 12/12/22  2:36 PM   Result Value Ref Range    INR 1.0 0.9 - 1.1      Prothrombin time 10.4 9.0 - 11.1 sec   PTT    Collection Time: 12/12/22  2:36 PM   Result Value Ref Range    aPTT 22.9 22.1 - 31.0 sec    aPTT, therapeutic range     58.0 - 77.0 SECS       Radiologic Studies -   No orders to display     No results found. Medical Decision Making   I am the first provider for this patient. I reviewed the vital signs, available nursing notes, past medical history, past surgical history, family history and social history. Vital Signs-Reviewed the patient's vital signs. Patient Vitals for the past 24 hrs:   Temp Pulse Resp BP SpO2   12/12/22 1431 -- 93 -- (!) 143/97 --   12/12/22 1429 -- 89 -- (!) 143/90 --   12/12/22 1427 -- 86 -- 125/82 --   12/12/22 1321 98.8 °F (37.1 °C) 100 18 (!) 146/99 100 %     Pulse Oximetry Analysis - 100% on RA (normal)    Cardiac Monitor:   Rate: 86 bpm  Rhythm: Normal Sinus Rhythm     ED EKG interpretation: 14:21  Rhythm: normal sinus rhythm; and regular . Rate (approx.): 82; Axis: normal; P wave: normal; QRS interval: normal ; ST/T wave: normal; Other findings: normal. This EKG was interpreted by Leticia Mcnair MD,ED Provider.      Records Reviewed: Nursing Notes, Old Medical Records, and Previous Laboratory Studies    Provider Notes (Medical Decision Making):   31-year-old female presents with lightheadedness and dizziness with prolonged standing that has progressively gotten worse over the past week. Differential includes orthostatic hypotension, menorrhagia, iron deficiency anemia, and low suspicion for arrhythmia or PE. Will obtain an EKG, check orthostatics, basic labs including coags and type and screen, and reassess. Patient is awaiting a hematology follow-up. ED Course:   Initial assessment performed. The patients presenting problems have been discussed, and they are in agreement with the care plan formulated and outlined with them. I have encouraged them to ask questions as they arise throughout their visit. Progress Note  3:35 PM  I have re-evaluated pt and informed her of her hemoglobin. She is not far from her baseline at 8.8 today and just finished a heavy menstrual cycle. Will increase her iron supplements to twice daily while she is awaiting hematology follow-up. Discussed plan of care with patient who agrees. Progress Note:   Updated pt on all returned results and findings. Discussed the importance of proper follow up as referred below along with return precautions. Pt in agreement with the care plan and expresses agreement with and understanding of all items discussed. Disposition:  Discharge Note:  The pt is ready for discharge. The pt's signs, symptoms, diagnosis, and discharge instructions have been discussed and pt has conveyed their understanding. The pt is to follow up as recommended or return to ER should their symptoms worsen. Plan has been discussed and pt is in agreement. PLAN:  1. Current Discharge Medication List        START taking these medications    Details   ferrous sulfate (IRON) 325 mg (65 mg iron) EC tablet Take 1 Tablet by mouth ACB/HS. Qty: 60 Tablet, Refills: 1  Start date: 12/12/2022           2.    Follow-up Information       Follow up With Specialties Details Why Contact Info    Soila Bui MD Family Medicine Schedule an appointment as soon as possible for a visit   [address] [place] [address]  [address] [facility]  [phone] Senait Garcia MD Hematology and Oncology, Hematology Physician, Oncology, Internal Medicine Physician Schedule an appointment as soon as possible for a visit   [address] [place] [address]  [address] [address] [phone]      [facility] EMERGENCY DEPT Emergency Medicine  As needed, If symptoms worsen [address] [facility]  [phone]          Return to ED if worse     Diagnosis     Clinical Impression:   1. Symptomatic anemia            Please note that this dictation was completed with Dragon, computer voice recognition software. Quite often unanticipated grammatical, syntax, homophones, and other interpretive errors are inadvertently transcribed by the computer software. Please disregard these errors. Additionally, please excuse any errors that have escaped final proofreading.

## 2022-12-12 NOTE — ED NOTES
Pt presents ambulatory to ED complaining of intermittent dizziness over the past few months. Pt states it happens usually when she has been standing for a while. Pt reports that she has not had much of an appetite and is having to force herself to eat but states that she sometimes does not eat enough. Pt states she drinks a good amount of water on a daily basis and chews on ice chips. Pt reports that her menstrual cycle just ended last week. Pt has hx of HTN. Pt denies N/V. Pt is alert and oriented x 4, RR even and unlabored, skin is warm and dry. Assesment completed and pt updated on plan of care. Emergency Department Nursing Plan of Care       The Nursing Plan of Care is developed from the Nursing assessment and Emergency Department Attending provider initial evaluation. The plan of care may be reviewed in the ED Provider note.     The Plan of Care was developed with the following considerations:   Patient / Family readiness to learn indicated by:verbalized understanding  Persons(s) to be included in education: patient  Barriers to Learning/Limitations:No    Signed     Destiny Reilly RN    12/12/2022   2:06 PM

## 2022-12-14 LAB
ATRIAL RATE: 82 BPM
CALCULATED P AXIS, ECG09: 78 DEGREES
CALCULATED R AXIS, ECG10: 82 DEGREES
CALCULATED T AXIS, ECG11: 84 DEGREES
DIAGNOSIS, 93000: NORMAL
P-R INTERVAL, ECG05: 142 MS
Q-T INTERVAL, ECG07: 392 MS
QRS DURATION, ECG06: 86 MS
QTC CALCULATION (BEZET), ECG08: 457 MS
VENTRICULAR RATE, ECG03: 82 BPM

## 2023-01-09 ENCOUNTER — APPOINTMENT (OUTPATIENT)
Dept: CT IMAGING | Age: 43
End: 2023-01-09
Attending: STUDENT IN AN ORGANIZED HEALTH CARE EDUCATION/TRAINING PROGRAM
Payer: MEDICAID

## 2023-01-09 ENCOUNTER — HOSPITAL ENCOUNTER (EMERGENCY)
Age: 43
Discharge: HOME | End: 2023-01-09
Attending: STUDENT IN AN ORGANIZED HEALTH CARE EDUCATION/TRAINING PROGRAM
Payer: MEDICAID

## 2023-01-09 VITALS
BODY MASS INDEX: 25.88 KG/M2 | OXYGEN SATURATION: 100 % | DIASTOLIC BLOOD PRESSURE: 90 MMHG | WEIGHT: 161 LBS | HEART RATE: 88 BPM | TEMPERATURE: 98.4 F | HEIGHT: 66 IN | SYSTOLIC BLOOD PRESSURE: 140 MMHG | RESPIRATION RATE: 18 BRPM

## 2023-01-09 DIAGNOSIS — G89.29 CHRONIC BILATERAL LOW BACK PAIN WITHOUT SCIATICA: ICD-10-CM

## 2023-01-09 DIAGNOSIS — M48.02 CERVICAL STENOSIS OF SPINE: Primary | ICD-10-CM

## 2023-01-09 DIAGNOSIS — M54.50 CHRONIC BILATERAL LOW BACK PAIN WITHOUT SCIATICA: ICD-10-CM

## 2023-01-09 DIAGNOSIS — M54.2 NECK PAIN: ICD-10-CM

## 2023-01-09 PROCEDURE — 74011250636 HC RX REV CODE- 250/636: Performed by: STUDENT IN AN ORGANIZED HEALTH CARE EDUCATION/TRAINING PROGRAM

## 2023-01-09 PROCEDURE — 72125 CT NECK SPINE W/O DYE: CPT

## 2023-01-09 PROCEDURE — 74011250637 HC RX REV CODE- 250/637: Performed by: STUDENT IN AN ORGANIZED HEALTH CARE EDUCATION/TRAINING PROGRAM

## 2023-01-09 PROCEDURE — 96372 THER/PROPH/DIAG INJ SC/IM: CPT

## 2023-01-09 PROCEDURE — 99284 EMERGENCY DEPT VISIT MOD MDM: CPT

## 2023-01-09 PROCEDURE — 74011636637 HC RX REV CODE- 636/637: Performed by: STUDENT IN AN ORGANIZED HEALTH CARE EDUCATION/TRAINING PROGRAM

## 2023-01-09 RX ORDER — PREDNISONE 20 MG/1
40 TABLET ORAL ONCE
Status: COMPLETED | OUTPATIENT
Start: 2023-01-09 | End: 2023-01-09

## 2023-01-09 RX ORDER — KETOROLAC TROMETHAMINE 30 MG/ML
30 INJECTION, SOLUTION INTRAMUSCULAR; INTRAVENOUS
Status: COMPLETED | OUTPATIENT
Start: 2023-01-09 | End: 2023-01-09

## 2023-01-09 RX ORDER — ACETAMINOPHEN 325 MG/1
975 TABLET ORAL
Status: COMPLETED | OUTPATIENT
Start: 2023-01-09 | End: 2023-01-09

## 2023-01-09 RX ORDER — METHOCARBAMOL 750 MG/1
1500 TABLET, FILM COATED ORAL
Qty: 40 TABLET | Refills: 0 | Status: SHIPPED | OUTPATIENT
Start: 2023-01-09 | End: 2023-01-09 | Stop reason: SDUPTHER

## 2023-01-09 RX ORDER — METHOCARBAMOL 750 MG/1
750 TABLET, FILM COATED ORAL
Qty: 20 TABLET | Refills: 0 | Status: SHIPPED | OUTPATIENT
Start: 2023-01-09 | End: 2023-01-16

## 2023-01-09 RX ORDER — METHYLPREDNISOLONE 4 MG/1
TABLET ORAL
Qty: 1 DOSE PACK | Refills: 0 | Status: SHIPPED | OUTPATIENT
Start: 2023-01-09

## 2023-01-09 RX ADMIN — PREDNISONE 40 MG: 20 TABLET ORAL at 19:15

## 2023-01-09 RX ADMIN — ACETAMINOPHEN 975 MG: 325 TABLET, FILM COATED ORAL at 19:15

## 2023-01-09 RX ADMIN — KETOROLAC TROMETHAMINE 30 MG: 30 INJECTION, SOLUTION INTRAMUSCULAR; INTRAVENOUS at 19:15

## 2023-01-09 NOTE — Clinical Note
[facility] EMERGENCY DEPT  [address] [facility] [id number] [phone]    Work/School Note    Date: 2023    To Whom It May concern:      Tamela Hammond was seen and treated today in the emergency room by the following provider(s):  Attending Provider: Deacon Reese MD.      Tamela Hammond is excused from work/school on 23. She is clear to return to work/school on 01/10/23.         Sincerely,          Bang Rich MD

## 2023-01-09 NOTE — ED TRIAGE NOTES
Patient c/o lower back pain and neck pain x 1 week. Bilateral hand tingling and numbness x 2 months, increasing in severity recently. Patient denies injury.

## 2023-01-10 NOTE — ED NOTES
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
## 2023-09-14 ENCOUNTER — HOSPITAL ENCOUNTER (EMERGENCY)
Facility: HOSPITAL | Age: 43
Discharge: HOME | End: 2023-09-14
Payer: MEDICAID

## 2023-09-14 VITALS
HEART RATE: 82 BPM | OXYGEN SATURATION: 100 % | RESPIRATION RATE: 19 BRPM | WEIGHT: 181.66 LBS | BODY MASS INDEX: 29.2 KG/M2 | TEMPERATURE: 97.7 F | HEIGHT: 66 IN | DIASTOLIC BLOOD PRESSURE: 85 MMHG | SYSTOLIC BLOOD PRESSURE: 135 MMHG

## 2023-09-14 DIAGNOSIS — Z20.822 CONTACT WITH AND (SUSPECTED) EXPOSURE TO COVID-19: Primary | ICD-10-CM

## 2023-09-14 DIAGNOSIS — R52 BODY ACHES: ICD-10-CM

## 2023-09-14 DIAGNOSIS — B34.9 VIRAL SYNDROME: ICD-10-CM

## 2023-09-14 DIAGNOSIS — R53.81 MALAISE: ICD-10-CM

## 2023-09-14 LAB
DEPRECATED S PYO AG THROAT QL EIA: NEGATIVE
FLUAV RNA SPEC QL NAA+PROBE: NOT DETECTED
FLUBV RNA SPEC QL NAA+PROBE: NOT DETECTED
SARS-COV-2 RNA RESP QL NAA+PROBE: NOT DETECTED

## 2023-09-14 PROCEDURE — 87635 SARS-COV-2 COVID-19 AMP PRB: CPT

## 2023-09-14 PROCEDURE — 87636 SARSCOV2 & INF A&B AMP PRB: CPT

## 2023-09-14 PROCEDURE — 87880 STREP A ASSAY W/OPTIC: CPT

## 2023-09-14 PROCEDURE — 99283 EMERGENCY DEPT VISIT LOW MDM: CPT

## 2023-09-14 PROCEDURE — 87070 CULTURE OTHR SPECIMN AEROBIC: CPT

## 2023-09-14 PROCEDURE — 6370000000 HC RX 637 (ALT 250 FOR IP): Performed by: PHYSICIAN ASSISTANT

## 2023-09-14 RX ORDER — ACETAMINOPHEN 325 MG/1
650 TABLET ORAL EVERY 6 HOURS PRN
Qty: 30 TABLET | Refills: 0 | Status: SHIPPED | OUTPATIENT
Start: 2023-09-14

## 2023-09-14 RX ORDER — IBUPROFEN 600 MG/1
600 TABLET ORAL
Status: COMPLETED | OUTPATIENT
Start: 2023-09-14 | End: 2023-09-14

## 2023-09-14 RX ORDER — IBUPROFEN 200 MG
400 TABLET ORAL EVERY 6 HOURS PRN
Qty: 30 TABLET | Refills: 0 | Status: SHIPPED | OUTPATIENT
Start: 2023-09-14

## 2023-09-14 RX ORDER — IBUPROFEN 400 MG/1
400 TABLET ORAL EVERY 6 HOURS PRN
Qty: 120 TABLET | Refills: 0 | Status: CANCELLED | OUTPATIENT
Start: 2023-09-14

## 2023-09-14 RX ORDER — ONDANSETRON 4 MG/1
4 TABLET, ORALLY DISINTEGRATING ORAL 3 TIMES DAILY PRN
Qty: 21 TABLET | Refills: 0 | Status: SHIPPED | OUTPATIENT
Start: 2023-09-14

## 2023-09-14 RX ADMIN — IBUPROFEN 600 MG: 600 TABLET ORAL at 11:02

## 2023-09-14 ASSESSMENT — PAIN DESCRIPTION - ORIENTATION: ORIENTATION: MID

## 2023-09-14 ASSESSMENT — PAIN DESCRIPTION - LOCATION
LOCATION: CHEST;THROAT
LOCATION: THROAT;HEAD

## 2023-09-14 ASSESSMENT — PAIN DESCRIPTION - FREQUENCY: FREQUENCY: CONTINUOUS

## 2023-09-14 ASSESSMENT — PAIN DESCRIPTION - DESCRIPTORS
DESCRIPTORS: SORE
DESCRIPTORS: ACHING;THROBBING;TENDER;SORE

## 2023-09-14 ASSESSMENT — PAIN DESCRIPTION - PAIN TYPE: TYPE: ACUTE PAIN

## 2023-09-14 ASSESSMENT — PAIN SCALES - GENERAL
PAINLEVEL_OUTOF10: 8
PAINLEVEL_OUTOF10: 8

## 2023-09-14 ASSESSMENT — PAIN - FUNCTIONAL ASSESSMENT: PAIN_FUNCTIONAL_ASSESSMENT: 0-10

## 2023-09-14 NOTE — DISCHARGE INSTRUCTIONS
Thank You! It was a pleasure taking care of you in our Emergency Department today. We know that when you come to [email or web address] you are entrusting us with your health, comfort, and safety. Our clinicians honor that trust, and truly appreciate the opportunity to care for you and your loved ones. We also value your feedback. If you receive a survey about your Emergency Department experience today, please fill it out. We care about our patients' feedback, and we listen to what you have to say. Thank you. Otto Gonzalez PA-C    ____________________________________________________________________  I have included a copy of your lab results and/or radiologic studies from today's visit so you can have them easily available at your follow-up visit.    Recent Results (from the past 12 hour(s))   COVID-19 & Influenza Combo    Collection Time: 09/14/23  9:56 AM    Specimen: Nasopharyngeal   Result Value Ref Range    SARS-CoV-2, PCR Not detected NOTD      Rapid Influenza A By PCR Not detected      Rapid Influenza B By PCR Not detected     Rapid Strep Screen    Collection Time: 09/14/23 10:39 AM    Specimen: Swab; Throat   Result Value Ref Range    Strep A Ag Negative NEG         No orders to display     [unfilled]

## 2023-09-14 NOTE — ED NOTES

## 2023-09-14 NOTE — ED TRIAGE NOTES
Patient comes to the ED for evaluation of a cough, sore throat, body aches and chills.  Known COVID exposure but a (_) COVID \"test \" one week ago

## 2023-09-14 NOTE — ED PROVIDER NOTES
[facility] EMERGENCY DEPT  EMERGENCY DEPARTMENT ENCOUNTER       Pt Name: Terence Guerra  MRN: [id number]  [address] [facility] 1980  Date of evaluation: 9/14/2023  Provider: CHRISTINE Oliver   PCP: Mohini Burleson MD  Note Started: 10:25 AM EDT 9/14/23     CHIEF COMPLAINT       Chief Complaint   Patient presents with    Generalized Body Aches    Chills    Cough     (+) COVID exposure        HISTORY OF PRESENT ILLNESS: 1 or more elements      History From: Patient  None     Terence Guerra is a 37 y.o. female who presents to the ED for evaluation of generalized body aches, chills and intermittent cough, sinus congestion and malaise for the past several days. States they have a known recent covid19 exposure. States they took a covid test with a family member on Monday, they tested positive, but patient states her test came back negative. States on Monday she just had some runny nose and felt like \"it was just allergies\" but from yesterday into today she has had generalized body aches, chills and malaise. Endorses intermittent cough, but denies chest pain, shortness of breath, or wheezing. States she did have some nausea and intermittent loose stools, but denies dark tarry or bright red bloody stools. Denies associated abdominal pain or vomiting. Endorses decreased appetite, but tolerating p.o. well. Notes were all reviewed and agreed with or any disagreements were addressed in the HPI. REVIEW OF SYSTEMS      Review of Systems   All other systems reviewed and are negative. Positives and Pertinent negatives as per HPI. PAST HISTORY     Past Medical History:  Past Medical History:   Diagnosis Date    Anemia     Headache     Hypertension     Neurological disorder     Migraines    Other ill-defined conditions(799.89)     Palpitations    Psychiatric disorder     Anxiety       Past Surgical History:  Past Surgical History:   Procedure Laterality Date    GYN      tubal ligation       Family History:  History reviewed.  No

## 2023-09-15 LAB
SARS-COV-2 RNA RESP QL NAA+PROBE: NOT DETECTED
SOURCE: NORMAL

## 2023-09-16 LAB
BACTERIA SPEC CULT: NORMAL
SERVICE CMNT-IMP: NORMAL

## 2024-02-03 ENCOUNTER — HOSPITAL ENCOUNTER (EMERGENCY)
Facility: HOSPITAL | Age: 44
Discharge: HOME | End: 2024-02-03
Attending: EMERGENCY MEDICINE
Payer: MEDICAID

## 2024-02-03 VITALS
DIASTOLIC BLOOD PRESSURE: 88 MMHG | RESPIRATION RATE: 22 BRPM | OXYGEN SATURATION: 100 % | SYSTOLIC BLOOD PRESSURE: 129 MMHG | TEMPERATURE: 97.8 F | HEART RATE: 91 BPM

## 2024-02-03 DIAGNOSIS — R07.9 CHEST PAIN, UNSPECIFIED TYPE: Primary | ICD-10-CM

## 2024-02-03 DIAGNOSIS — R51.9 ACUTE NONINTRACTABLE HEADACHE, UNSPECIFIED HEADACHE TYPE: ICD-10-CM

## 2024-02-03 DIAGNOSIS — D64.9 CHRONIC ANEMIA: ICD-10-CM

## 2024-02-03 LAB
ALBUMIN SERPL-MCNC: 3.5 G/DL (ref 3.5–5)
ALBUMIN/GLOB SERPL: 1.1 (ref 1.1–2.2)
ALP SERPL-CCNC: 55 U/L (ref 45–117)
ALT SERPL-CCNC: 16 U/L (ref 12–78)
ANION GAP SERPL CALC-SCNC: 8 MMOL/L (ref 5–15)
AST SERPL-CCNC: 22 U/L (ref 15–37)
BASOPHILS # BLD: 0.1 K/UL (ref 0–0.1)
BASOPHILS NFR BLD: 2 % (ref 0–1)
BILIRUB SERPL-MCNC: 0.2 MG/DL (ref 0.2–1)
BUN SERPL-MCNC: 13 MG/DL (ref 6–20)
BUN/CREAT SERPL: 19 (ref 12–20)
CALCIUM SERPL-MCNC: 8.2 MG/DL (ref 8.5–10.1)
CHLORIDE SERPL-SCNC: 105 MMOL/L (ref 97–108)
CO2 SERPL-SCNC: 27 MMOL/L (ref 21–32)
CREAT SERPL-MCNC: 0.69 MG/DL (ref 0.55–1.02)
DIFFERENTIAL METHOD BLD: ABNORMAL
EKG ATRIAL RATE: 91 BPM
EKG DIAGNOSIS: NORMAL
EKG P AXIS: 41 DEGREES
EKG P-R INTERVAL: 118 MS
EKG Q-T INTERVAL: 382 MS
EKG QRS DURATION: 80 MS
EKG QTC CALCULATION (BAZETT): 469 MS
EKG R AXIS: 53 DEGREES
EKG T AXIS: 69 DEGREES
EKG VENTRICULAR RATE: 91 BPM
EOSINOPHIL # BLD: 0.1 K/UL (ref 0–0.4)
EOSINOPHIL NFR BLD: 2 % (ref 0–7)
ERYTHROCYTE [DISTWIDTH] IN BLOOD BY AUTOMATED COUNT: 18.2 % (ref 11.5–14.5)
GLOBULIN SER CALC-MCNC: 3.3 G/DL (ref 2–4)
GLUCOSE SERPL-MCNC: 87 MG/DL (ref 65–100)
HCT VFR BLD AUTO: 26.8 % (ref 35–47)
HGB BLD-MCNC: 8.6 G/DL (ref 11.5–16)
IMM GRANULOCYTES # BLD AUTO: 0 K/UL (ref 0–0.04)
IMM GRANULOCYTES NFR BLD AUTO: 0 % (ref 0–0.5)
LYMPHOCYTES # BLD: 1.4 K/UL (ref 0.8–3.5)
LYMPHOCYTES NFR BLD: 32 % (ref 12–49)
MCH RBC QN AUTO: 21.7 PG (ref 26–34)
MCHC RBC AUTO-ENTMCNC: 32.1 G/DL (ref 30–36.5)
MCV RBC AUTO: 67.7 FL (ref 80–99)
MONOCYTES # BLD: 0.5 K/UL (ref 0–1)
MONOCYTES NFR BLD: 13 % (ref 5–13)
NEUTS SEG # BLD: 2.2 K/UL (ref 1.8–8)
NEUTS SEG NFR BLD: 51 % (ref 32–75)
NRBC # BLD: 0 K/UL (ref 0–0.01)
NRBC BLD-RTO: 0 PER 100 WBC
PLATELET # BLD AUTO: 258 K/UL (ref 150–400)
PMV BLD AUTO: 9.1 FL (ref 8.9–12.9)
POTASSIUM SERPL-SCNC: 3.6 MMOL/L (ref 3.5–5.1)
PROT SERPL-MCNC: 6.8 G/DL (ref 6.4–8.2)
RBC # BLD AUTO: 3.96 M/UL (ref 3.8–5.2)
SODIUM SERPL-SCNC: 140 MMOL/L (ref 136–145)
TROPONIN I SERPL HS-MCNC: 5 NG/L (ref 0–51)
TROPONIN I SERPL HS-MCNC: 7 NG/L (ref 0–51)
WBC # BLD AUTO: 4.2 K/UL (ref 3.6–11)

## 2024-02-03 PROCEDURE — 96375 TX/PRO/DX INJ NEW DRUG ADDON: CPT

## 2024-02-03 PROCEDURE — 80053 COMPREHEN METABOLIC PANEL: CPT

## 2024-02-03 PROCEDURE — 2580000003 HC RX 258: Performed by: EMERGENCY MEDICINE

## 2024-02-03 PROCEDURE — 85025 COMPLETE CBC W/AUTO DIFF WBC: CPT

## 2024-02-03 PROCEDURE — 84484 ASSAY OF TROPONIN QUANT: CPT

## 2024-02-03 PROCEDURE — 93005 ELECTROCARDIOGRAM TRACING: CPT | Performed by: EMERGENCY MEDICINE

## 2024-02-03 PROCEDURE — 6360000002 HC RX W HCPCS: Performed by: EMERGENCY MEDICINE

## 2024-02-03 PROCEDURE — 99284 EMERGENCY DEPT VISIT MOD MDM: CPT

## 2024-02-03 PROCEDURE — 36415 COLL VENOUS BLD VENIPUNCTURE: CPT

## 2024-02-03 PROCEDURE — 96374 THER/PROPH/DIAG INJ IV PUSH: CPT

## 2024-02-03 RX ORDER — KETOROLAC TROMETHAMINE 30 MG/ML
30 INJECTION, SOLUTION INTRAMUSCULAR; INTRAVENOUS
Status: COMPLETED | OUTPATIENT
Start: 2024-02-03 | End: 2024-02-03

## 2024-02-03 RX ORDER — LORAZEPAM 2 MG/ML
0.5 INJECTION INTRAMUSCULAR ONCE
Status: COMPLETED | OUTPATIENT
Start: 2024-02-03 | End: 2024-02-03

## 2024-02-03 RX ORDER — 0.9 % SODIUM CHLORIDE 0.9 %
1000 INTRAVENOUS SOLUTION INTRAVENOUS ONCE
Status: COMPLETED | OUTPATIENT
Start: 2024-02-03 | End: 2024-02-03

## 2024-02-03 RX ORDER — PROCHLORPERAZINE EDISYLATE 5 MG/ML
10 INJECTION INTRAMUSCULAR; INTRAVENOUS ONCE
Status: COMPLETED | OUTPATIENT
Start: 2024-02-03 | End: 2024-02-03

## 2024-02-03 RX ADMIN — KETOROLAC TROMETHAMINE 30 MG: 30 INJECTION, SOLUTION INTRAMUSCULAR; INTRAVENOUS at 12:29

## 2024-02-03 RX ADMIN — SODIUM CHLORIDE 1000 ML: 9 INJECTION, SOLUTION INTRAVENOUS at 12:32

## 2024-02-03 RX ADMIN — PROCHLORPERAZINE EDISYLATE 10 MG: 5 INJECTION INTRAMUSCULAR; INTRAVENOUS at 12:29

## 2024-02-03 RX ADMIN — LORAZEPAM 0.5 MG: 2 INJECTION INTRAMUSCULAR; INTRAVENOUS at 12:29

## 2024-02-03 ASSESSMENT — HEART SCORE
ECG: 0
ECG: 0

## 2024-02-03 NOTE — ED NOTES
Patient has been instructed that they have been given ativan* which contains opioids, benzodiazepines, or other sedating drugs. Patient is aware that they will need to refrain from driving or operating heavy machinery after taking this medication.  Patient also instructed that they need to avoid drinking alcohol and using other products containing opioids, benzodiazepines, or other sedating drugs.  Patient verbalized understanding.

## 2024-02-03 NOTE — ED NOTES
Pt presents to ED complaining of middle, non-radiating chest tightness that began earlier today. Pt also endorses shortness of breath with chest tightness and frontal headache that began when she arrived to ED. Pt endorses light sensitivity. Pt is alert and oriented x 4, RR even and unlabored, skin is warm and dry. Pt appears in NAD at this time. Assessment completed and pt updated on plan of care.  Call bell in reach.   Emergency Department Nursing Plan of Care  The Nursing Plan of Care is developed from the Nursing assessment and Emergency Department Attending provider initial evaluation.  The plan of care may be reviewed in the “ED Provider note”.  The Plan of Care was developed with the following considerations:  Patient / Family readiness to learn indicated by:Refer to Medical chart in [facility]  Persons(s) to be included in education: Refer to Medical chart in [facility]  Barriers to Learning/Limitations:Normal

## 2024-02-03 NOTE — ED PROVIDER NOTES
[facility] EMERGENCY DEPT  EMERGENCY DEPARTMENT ENCOUNTER    Patient Name: Susi Jiang  MRN: [id number]  YOB: 1980  Provider: Sorin Garcia MD  PCP: Hector Resendez MD  Time/Date of evaluation: 12:04 PM EST on 2/3/24    History of Presenting Illness     Chief Complaint   Patient presents with    Chest Pain     History Provided by: Patient   History is limited by: Nothing    HISTORY (Narrative):   Susi Jiang is a 43 y.o. female with a PMHX of anemia, anxiety, migraine headaches, and hypertension who presents to the emergency department (room 10) by POV C/O substernal chest pain that started this morning when she woke up.  Patient also reports a generalized headache.  Her pain is located between her breasts and does not radiate.  She does tell me she has been under quite a bit of stress recently and believes that may be contributing to both her headache and her chest pain.  She has a history of migraine headaches.  She denies any shortness of breath, diaphoresis, exertional pain, nausea, or vomiting.    Nursing Notes were all reviewed and agreed with or any disagreements were addressed in the HPI.    Past History     PAST MEDICAL HISTORY:  Past Medical History:   Diagnosis Date    Anemia     Headache     Hypertension     Neurological disorder     Migraines    Other ill-defined conditions(769.89)     Palpitations    Psychiatric disorder     Anxiety       PAST SURGICAL HISTORY:  Past Surgical History:   Procedure Laterality Date    GYN      tubal ligation       FAMILY HISTORY:  History reviewed. No pertinent family history.    SOCIAL HISTORY:  Social History     Tobacco Use    Smoking status: Some Days     Types: Cigars    Smokeless tobacco: Former   Vaping Use    Vaping Use: Never used   Substance Use Topics    Alcohol use: Not Currently    Drug use: Yes     Types: Marijuana (Weed)       MEDICATIONS:  No current facility-administered medications for this encounter.     Current Outpatient Medications  Sorin MALDONADO MD  Authorized by: Sorin Garcia MD    ECG interpreted by ED Physician in the absence of a cardiologist: yes    Interpretation:     Interpretation: normal    Rate:     ECG rate:  91    ECG rate assessment: normal    Rhythm:     Rhythm: sinus rhythm    Ectopy:     Ectopy: none    QRS:     QRS axis:  Normal  ST segments:     ST segments:  Normal  T waves:     T waves: normal        ED Course   Patient Vitals for the past 24 hrs:   BP Temp Temp src Pulse Resp SpO2   02/03/24 1115 129/88 97.8 °F (36.6 °C) Oral 91 22 100 %       12:04 PM EST I (Sorin Garcia MD) am the first provider for this patient. Initial assessment performed. I reviewed the vital signs, available nursing notes, past medical history, past surgical history, family history and social history. The patients presenting problems have been discussed, and they are in agreement with the care plan formulated and outlined with them.  I have encouraged them to ask questions as they arise throughout their visit.    RECORDS REVIEWED: Nursing Notes and Old Medical Records    Is this patient to be included in the SEP-1 core measure? No Exclusion criteria - the patient is NOT to be included for SEP-1 Core Measure due to: Infection is not suspected    MEDICATIONS ADMINISTERED IN THE ED:  Medications   ketorolac (TORADOL) injection 30 mg (30 mg IntraVENous Given 2/3/24 1229)   LORazepam (ATIVAN) injection 0.5 mg (0.5 mg IntraVENous Given 2/3/24 1229)   prochlorperazine (COMPAZINE) injection 10 mg (10 mg IntraVENous Given 2/3/24 1229)   sodium chloride 0.9 % bolus 1,000 mL (0 mLs IntraVENous Stopped 2/3/24 1303)       ED Course as of 02/03/24 1450   Sat Feb 03, 2024   1312 Patient's hemoglobin is 8.6 with an MCV of 67.7 consistent with iron deficiency anemia.  In review of her prior hemoglobins, she has been 8.8-9.2 over the past few years.  Her troponin is 5, unlikely to be ACS with her nonischemic EKG and normal troponin.  Will continue to

## 2024-02-03 NOTE — ED NOTES
Discharge instructions were given to the patient by Jeremias Vu RN  .  The patient left the Emergency Department alert and oriented and in no acute distress with 0 prescription(s). The patient was encouraged to call or return to the ED for worsening issues or problems and was encouraged to schedule a follow up appointment for continuing care.  The patient verbalized understanding of discharge instructions and prescriptions; all questions were answered. The patient has no further concerns at this time.

## 2024-02-05 LAB
EKG ATRIAL RATE: 91 BPM
EKG DIAGNOSIS: NORMAL
EKG P AXIS: 41 DEGREES
EKG P-R INTERVAL: 118 MS
EKG Q-T INTERVAL: 382 MS
EKG QRS DURATION: 80 MS
EKG QTC CALCULATION (BAZETT): 469 MS
EKG R AXIS: 53 DEGREES
EKG T AXIS: 69 DEGREES
EKG VENTRICULAR RATE: 91 BPM

## 2025-09-04 ENCOUNTER — HOSPITAL ENCOUNTER (OUTPATIENT)
Facility: HOSPITAL | Age: 45
Discharge: HOME | End: 2025-09-04
Payer: MEDICAID

## 2025-09-04 VITALS
WEIGHT: 205.03 LBS | BODY MASS INDEX: 32.95 KG/M2 | DIASTOLIC BLOOD PRESSURE: 91 MMHG | HEART RATE: 79 BPM | TEMPERATURE: 98 F | SYSTOLIC BLOOD PRESSURE: 149 MMHG | HEIGHT: 66 IN

## 2025-09-04 LAB
ANION GAP SERPL CALC-SCNC: 8 MMOL/L (ref 2–14)
BASOPHILS # BLD: 0.03 K/UL (ref 0–0.1)
BASOPHILS NFR BLD: 0.5 % (ref 0–1)
BUN SERPL-MCNC: 18 MG/DL (ref 6–20)
BUN/CREAT SERPL: 23 (ref 12–20)
CALCIUM SERPL-MCNC: 9.3 MG/DL (ref 8.6–10)
CHLORIDE SERPL-SCNC: 104 MMOL/L (ref 98–107)
CO2 SERPL-SCNC: 25 MMOL/L (ref 20–29)
CREAT SERPL-MCNC: 0.79 MG/DL (ref 0.6–1)
DIFFERENTIAL METHOD BLD: ABNORMAL
EOSINOPHIL # BLD: 0.09 K/UL (ref 0–0.4)
EOSINOPHIL NFR BLD: 1.6 % (ref 0–7)
ERYTHROCYTE [DISTWIDTH] IN BLOOD BY AUTOMATED COUNT: 17.4 % (ref 11.5–14.5)
GLUCOSE SERPL-MCNC: 90 MG/DL (ref 65–100)
HCT VFR BLD AUTO: 34.3 % (ref 35–47)
HGB BLD-MCNC: 10.9 G/DL (ref 11.5–16)
IMM GRANULOCYTES # BLD AUTO: 0.02 K/UL (ref 0–0.04)
IMM GRANULOCYTES NFR BLD AUTO: 0.3 % (ref 0–0.5)
LYMPHOCYTES # BLD: 1.89 K/UL (ref 0.8–3.5)
LYMPHOCYTES NFR BLD: 33 % (ref 12–49)
MCH RBC QN AUTO: 24.3 PG (ref 26–34)
MCHC RBC AUTO-ENTMCNC: 31.8 G/DL (ref 30–36.5)
MCV RBC AUTO: 76.4 FL (ref 80–99)
MONOCYTES # BLD: 0.57 K/UL (ref 0–1)
MONOCYTES NFR BLD: 9.9 % (ref 5–13)
NEUTS SEG # BLD: 3.13 K/UL (ref 1.8–8)
NEUTS SEG NFR BLD: 54.7 % (ref 32–75)
NRBC # BLD: 0 K/UL (ref 0–0.01)
NRBC BLD-RTO: 0 PER 100 WBC
PLATELET # BLD AUTO: 263 K/UL (ref 150–400)
PMV BLD AUTO: 10.5 FL (ref 8.9–12.9)
POTASSIUM SERPL-SCNC: 4.6 MMOL/L (ref 3.5–5.1)
RBC # BLD AUTO: 4.49 M/UL (ref 3.8–5.2)
SODIUM SERPL-SCNC: 136 MMOL/L (ref 136–145)
WBC # BLD AUTO: 5.7 K/UL (ref 3.6–11)

## 2025-09-04 PROCEDURE — 80048 BASIC METABOLIC PNL TOTAL CA: CPT

## 2025-09-04 PROCEDURE — 85025 COMPLETE CBC W/AUTO DIFF WBC: CPT

## 2025-09-04 ASSESSMENT — PAIN SCALES - GENERAL: PAINLEVEL_OUTOF10: 0

## 2025-09-07 LAB
EKG ATRIAL RATE: 74 BPM
EKG DIAGNOSIS: NORMAL
EKG P AXIS: 48 DEGREES
EKG P-R INTERVAL: 134 MS
EKG Q-T INTERVAL: 396 MS
EKG QRS DURATION: 80 MS
EKG QTC CALCULATION (BAZETT): 439 MS
EKG R AXIS: 34 DEGREES
EKG T AXIS: 48 DEGREES
EKG VENTRICULAR RATE: 74 BPM